# Patient Record
Sex: FEMALE | Race: BLACK OR AFRICAN AMERICAN | NOT HISPANIC OR LATINO | Employment: OTHER | URBAN - METROPOLITAN AREA
[De-identification: names, ages, dates, MRNs, and addresses within clinical notes are randomized per-mention and may not be internally consistent; named-entity substitution may affect disease eponyms.]

---

## 2019-04-17 ENCOUNTER — HOSPITAL ENCOUNTER (INPATIENT)
Facility: HOSPITAL | Age: 64
LOS: 3 days | Discharge: HOME | DRG: 189 | End: 2019-04-20
Attending: EMERGENCY MEDICINE | Admitting: HOSPITALIST
Payer: MEDICAID

## 2019-04-17 DIAGNOSIS — J96.22 ACUTE ON CHRONIC RESPIRATORY FAILURE WITH HYPOXIA AND HYPERCAPNIA: Primary | ICD-10-CM

## 2019-04-17 DIAGNOSIS — J96.21 ACUTE ON CHRONIC RESPIRATORY FAILURE WITH HYPOXIA AND HYPERCAPNIA: Primary | ICD-10-CM

## 2019-04-17 DIAGNOSIS — R06.02 SHORTNESS OF BREATH: ICD-10-CM

## 2019-04-17 DIAGNOSIS — J44.9 STAGE 4 VERY SEVERE COPD BY GOLD CLASSIFICATION: ICD-10-CM

## 2019-04-17 DIAGNOSIS — Z71.89 GOALS OF CARE, COUNSELING/DISCUSSION: ICD-10-CM

## 2019-04-17 PROBLEM — E11.9 TYPE 2 DIABETES MELLITUS, WITHOUT LONG-TERM CURRENT USE OF INSULIN: Status: ACTIVE | Noted: 2019-04-17

## 2019-04-17 PROBLEM — E78.5 HYPERLIPIDEMIA: Status: ACTIVE | Noted: 2019-04-17

## 2019-04-17 PROBLEM — I10 ESSENTIAL HYPERTENSION: Status: ACTIVE | Noted: 2019-04-17

## 2019-04-17 PROBLEM — D61.818 PANCYTOPENIA: Status: ACTIVE | Noted: 2019-01-26

## 2019-04-17 PROBLEM — R31.9 HEMATURIA: Status: ACTIVE | Noted: 2019-04-17

## 2019-04-17 PROBLEM — E44.0 MODERATE MALNUTRITION: Status: ACTIVE | Noted: 2019-02-21

## 2019-04-17 PROBLEM — D69.6 THROMBOCYTOPENIA: Status: ACTIVE | Noted: 2018-11-10

## 2019-04-17 PROBLEM — I50.30 (HFPEF) HEART FAILURE WITH PRESERVED EJECTION FRACTION: Status: ACTIVE | Noted: 2019-04-17

## 2019-04-17 PROBLEM — M32.9 SYSTEMIC LUPUS ERYTHEMATOSUS: Status: ACTIVE | Noted: 2019-01-26

## 2019-04-17 LAB
ALLENS TEST: ABNORMAL
ANION GAP SERPL CALC-SCNC: 13 MMOL/L (ref 8–16)
BASOPHILS # BLD AUTO: 0.01 K/UL (ref 0–0.2)
BASOPHILS NFR BLD: 0.2 % (ref 0–1.9)
BILIRUB UR QL STRIP: NEGATIVE
BNP SERPL-MCNC: 490 PG/ML (ref 0–99)
BUN SERPL-MCNC: 10 MG/DL (ref 8–23)
CALCIUM SERPL-MCNC: 9.3 MG/DL (ref 8.7–10.5)
CHLORIDE SERPL-SCNC: 84 MMOL/L (ref 95–110)
CLARITY UR REFRACT.AUTO: ABNORMAL
CO2 SERPL-SCNC: 47 MMOL/L (ref 23–29)
COLOR UR AUTO: YELLOW
CREAT SERPL-MCNC: 0.7 MG/DL (ref 0.5–1.4)
CRP SERPL-MCNC: 22.6 MG/L (ref 0–8.2)
DELSYS: ABNORMAL
DIFFERENTIAL METHOD: ABNORMAL
EOSINOPHIL # BLD AUTO: 0 K/UL (ref 0–0.5)
EOSINOPHIL NFR BLD: 0 % (ref 0–8)
EP: 5
EP: 5
ERYTHROCYTE [DISTWIDTH] IN BLOOD BY AUTOMATED COUNT: 13.3 % (ref 11.5–14.5)
ERYTHROCYTE [SEDIMENTATION RATE] IN BLOOD BY WESTERGREN METHOD: 14 MM/H
ERYTHROCYTE [SEDIMENTATION RATE] IN BLOOD BY WESTERGREN METHOD: 18 MM/H
ERYTHROCYTE [SEDIMENTATION RATE] IN BLOOD BY WESTERGREN METHOD: 29 MM/HR (ref 0–36)
EST. GFR  (AFRICAN AMERICAN): >60 ML/MIN/1.73 M^2
EST. GFR  (NON AFRICAN AMERICAN): >60 ML/MIN/1.73 M^2
FIO2: 40
FIO2: 50
GLUCOSE SERPL-MCNC: 211 MG/DL (ref 70–110)
GLUCOSE UR QL STRIP: ABNORMAL
HCO3 UR-SCNC: 48.7 MMOL/L (ref 24–28)
HCO3 UR-SCNC: 60.7 MMOL/L (ref 24–28)
HCO3 UR-SCNC: 64.8 MMOL/L (ref 24–28)
HCT VFR BLD AUTO: 39.6 % (ref 37–48.5)
HGB BLD-MCNC: 11 G/DL (ref 12–16)
HGB UR QL STRIP: ABNORMAL
IMM GRANULOCYTES # BLD AUTO: 0.01 K/UL (ref 0–0.04)
IMM GRANULOCYTES NFR BLD AUTO: 0.2 % (ref 0–0.5)
INFLUENZA A, MOLECULAR: NEGATIVE
INFLUENZA B, MOLECULAR: NEGATIVE
IP: 10
IP: 20
KETONES UR QL STRIP: ABNORMAL
LEUKOCYTE ESTERASE UR QL STRIP: NEGATIVE
LYMPHOCYTES # BLD AUTO: 0.7 K/UL (ref 1–4.8)
LYMPHOCYTES NFR BLD: 14.3 % (ref 18–48)
MCH RBC QN AUTO: 28.1 PG (ref 27–31)
MCHC RBC AUTO-ENTMCNC: 27.8 G/DL (ref 32–36)
MCV RBC AUTO: 101 FL (ref 82–98)
MICROSCOPIC COMMENT: ABNORMAL
MIN VOL: 5
MODE: ABNORMAL
MONOCYTES # BLD AUTO: 0.5 K/UL (ref 0.3–1)
MONOCYTES NFR BLD: 10.7 % (ref 4–15)
NEUTROPHILS # BLD AUTO: 3.5 K/UL (ref 1.8–7.7)
NEUTROPHILS NFR BLD: 74.6 % (ref 38–73)
NITRITE UR QL STRIP: NEGATIVE
NRBC BLD-RTO: 0 /100 WBC
PCO2 BLDA: 105 MMHG (ref 35–45)
PCO2 BLDA: 121.3 MMHG (ref 35–45)
PCO2 BLDA: 97.5 MMHG (ref 35–45)
PH SMN: 7.31 [PH] (ref 7.35–7.45)
PH SMN: 7.31 [PH] (ref 7.35–7.45)
PH SMN: 7.4 [PH] (ref 7.35–7.45)
PH UR STRIP: 8 [PH] (ref 5–8)
PLATELET # BLD AUTO: 82 K/UL (ref 150–350)
PMV BLD AUTO: 12.2 FL (ref 9.2–12.9)
PO2 BLDA: 51 MMHG (ref 40–60)
PO2 BLDA: 64 MMHG (ref 80–100)
PO2 BLDA: 71 MMHG (ref 80–100)
POC BE: 22 MMOL/L
POC BE: >30 MMOL/L
POC BE: >30 MMOL/L
POC SATURATED O2: 79 % (ref 95–100)
POC SATURATED O2: 86 % (ref 95–100)
POC SATURATED O2: 92 % (ref 95–100)
POC TCO2: >50 MMOL/L (ref 23–27)
POC TCO2: >50 MMOL/L (ref 23–27)
POC TCO2: >50 MMOL/L (ref 24–29)
POCT GLUCOSE: 286 MG/DL (ref 70–110)
POTASSIUM SERPL-SCNC: 4.3 MMOL/L (ref 3.5–5.1)
PROT UR QL STRIP: NEGATIVE
RBC # BLD AUTO: 3.92 M/UL (ref 4–5.4)
RBC #/AREA URNS AUTO: 15 /HPF (ref 0–4)
SAMPLE: ABNORMAL
SITE: ABNORMAL
SODIUM SERPL-SCNC: 144 MMOL/L (ref 136–145)
SP GR UR STRIP: 1.01 (ref 1–1.03)
SP02: 98
SPECIMEN SOURCE: NORMAL
SPONT RATE: 18
SPONT RATE: 37
SQUAMOUS #/AREA URNS AUTO: 4 /HPF
TROPONIN I SERPL DL<=0.01 NG/ML-MCNC: 0.01 NG/ML (ref 0–0.03)
URN SPEC COLLECT METH UR: ABNORMAL
WBC # BLD AUTO: 4.75 K/UL (ref 3.9–12.7)

## 2019-04-17 PROCEDURE — 11000001 HC ACUTE MED/SURG PRIVATE ROOM

## 2019-04-17 PROCEDURE — 85025 COMPLETE CBC W/AUTO DIFF WBC: CPT

## 2019-04-17 PROCEDURE — 93010 EKG 12-LEAD: ICD-10-PCS | Mod: ,,, | Performed by: INTERNAL MEDICINE

## 2019-04-17 PROCEDURE — 99291 CRITICAL CARE FIRST HOUR: CPT | Mod: 25

## 2019-04-17 PROCEDURE — 86225 DNA ANTIBODY NATIVE: CPT

## 2019-04-17 PROCEDURE — 86235 NUCLEAR ANTIGEN ANTIBODY: CPT

## 2019-04-17 PROCEDURE — 25000003 PHARM REV CODE 250: Performed by: STUDENT IN AN ORGANIZED HEALTH CARE EDUCATION/TRAINING PROGRAM

## 2019-04-17 PROCEDURE — 81001 URINALYSIS AUTO W/SCOPE: CPT

## 2019-04-17 PROCEDURE — 80048 BASIC METABOLIC PNL TOTAL CA: CPT

## 2019-04-17 PROCEDURE — 99900035 HC TECH TIME PER 15 MIN (STAT)

## 2019-04-17 PROCEDURE — 63600175 PHARM REV CODE 636 W HCPCS: Performed by: EMERGENCY MEDICINE

## 2019-04-17 PROCEDURE — 93005 ELECTROCARDIOGRAM TRACING: CPT

## 2019-04-17 PROCEDURE — 85652 RBC SED RATE AUTOMATED: CPT

## 2019-04-17 PROCEDURE — 86140 C-REACTIVE PROTEIN: CPT

## 2019-04-17 PROCEDURE — 96375 TX/PRO/DX INJ NEW DRUG ADDON: CPT

## 2019-04-17 PROCEDURE — 82803 BLOOD GASES ANY COMBINATION: CPT

## 2019-04-17 PROCEDURE — 96374 THER/PROPH/DIAG INJ IV PUSH: CPT

## 2019-04-17 PROCEDURE — 63600175 PHARM REV CODE 636 W HCPCS: Performed by: PHYSICIAN ASSISTANT

## 2019-04-17 PROCEDURE — 25000242 PHARM REV CODE 250 ALT 637 W/ HCPCS: Performed by: PHYSICIAN ASSISTANT

## 2019-04-17 PROCEDURE — 94640 AIRWAY INHALATION TREATMENT: CPT

## 2019-04-17 PROCEDURE — 93010 ELECTROCARDIOGRAM REPORT: CPT | Mod: ,,, | Performed by: INTERNAL MEDICINE

## 2019-04-17 PROCEDURE — 36415 COLL VENOUS BLD VENIPUNCTURE: CPT

## 2019-04-17 PROCEDURE — 27000221 HC OXYGEN, UP TO 24 HOURS

## 2019-04-17 PROCEDURE — 25000242 PHARM REV CODE 250 ALT 637 W/ HCPCS: Performed by: STUDENT IN AN ORGANIZED HEALTH CARE EDUCATION/TRAINING PROGRAM

## 2019-04-17 PROCEDURE — 94761 N-INVAS EAR/PLS OXIMETRY MLT: CPT

## 2019-04-17 PROCEDURE — 87502 INFLUENZA DNA AMP PROBE: CPT

## 2019-04-17 PROCEDURE — 86038 ANTINUCLEAR ANTIBODIES: CPT

## 2019-04-17 PROCEDURE — 84484 ASSAY OF TROPONIN QUANT: CPT

## 2019-04-17 PROCEDURE — 94660 CPAP INITIATION&MGMT: CPT

## 2019-04-17 PROCEDURE — 99291 CRITICAL CARE FIRST HOUR: CPT | Mod: ,,, | Performed by: EMERGENCY MEDICINE

## 2019-04-17 PROCEDURE — 36600 WITHDRAWAL OF ARTERIAL BLOOD: CPT

## 2019-04-17 PROCEDURE — 99291 PR CRITICAL CARE, E/M 30-74 MINUTES: ICD-10-PCS | Mod: ,,, | Performed by: EMERGENCY MEDICINE

## 2019-04-17 PROCEDURE — 27000190 HC CPAP FULL FACE MASK W/VALVE

## 2019-04-17 PROCEDURE — 63600175 PHARM REV CODE 636 W HCPCS: Performed by: STUDENT IN AN ORGANIZED HEALTH CARE EDUCATION/TRAINING PROGRAM

## 2019-04-17 PROCEDURE — 83880 ASSAY OF NATRIURETIC PEPTIDE: CPT

## 2019-04-17 PROCEDURE — 25000242 PHARM REV CODE 250 ALT 637 W/ HCPCS: Performed by: EMERGENCY MEDICINE

## 2019-04-17 RX ORDER — PREDNISONE 20 MG/1
40 TABLET ORAL DAILY
Status: DISCONTINUED | OUTPATIENT
Start: 2019-04-18 | End: 2019-04-20 | Stop reason: HOSPADM

## 2019-04-17 RX ORDER — DILTIAZEM HYDROCHLORIDE 180 MG/1
180 CAPSULE, COATED, EXTENDED RELEASE ORAL DAILY
Status: DISCONTINUED | OUTPATIENT
Start: 2019-04-17 | End: 2019-04-20 | Stop reason: HOSPADM

## 2019-04-17 RX ORDER — MONTELUKAST SODIUM 10 MG/1
10 TABLET ORAL DAILY
Status: DISCONTINUED | OUTPATIENT
Start: 2019-04-18 | End: 2019-04-20 | Stop reason: HOSPADM

## 2019-04-17 RX ORDER — IPRATROPIUM BROMIDE AND ALBUTEROL SULFATE 2.5; .5 MG/3ML; MG/3ML
3 SOLUTION RESPIRATORY (INHALATION) EVERY 4 HOURS
Status: COMPLETED | OUTPATIENT
Start: 2019-04-17 | End: 2019-04-18

## 2019-04-17 RX ORDER — INSULIN ASPART 100 [IU]/ML
0-5 INJECTION, SOLUTION INTRAVENOUS; SUBCUTANEOUS
Status: DISCONTINUED | OUTPATIENT
Start: 2019-04-17 | End: 2019-04-20 | Stop reason: HOSPADM

## 2019-04-17 RX ORDER — DILTIAZEM HYDROCHLORIDE 180 MG/1
180 CAPSULE, COATED, EXTENDED RELEASE ORAL DAILY
Status: DISCONTINUED | OUTPATIENT
Start: 2019-04-18 | End: 2019-04-17

## 2019-04-17 RX ORDER — FUROSEMIDE 10 MG/ML
40 INJECTION INTRAMUSCULAR; INTRAVENOUS
Status: COMPLETED | OUTPATIENT
Start: 2019-04-17 | End: 2019-04-17

## 2019-04-17 RX ORDER — AMLODIPINE BESYLATE 10 MG/1
10 TABLET ORAL DAILY
Status: DISCONTINUED | OUTPATIENT
Start: 2019-04-18 | End: 2019-04-20 | Stop reason: HOSPADM

## 2019-04-17 RX ORDER — HYDROCHLOROTHIAZIDE 25 MG/1
25 TABLET ORAL DAILY
Status: DISCONTINUED | OUTPATIENT
Start: 2019-04-18 | End: 2019-04-18

## 2019-04-17 RX ORDER — DOXYCYCLINE HYCLATE 100 MG
100 TABLET ORAL EVERY 12 HOURS
Status: DISCONTINUED | OUTPATIENT
Start: 2019-04-17 | End: 2019-04-20 | Stop reason: HOSPADM

## 2019-04-17 RX ORDER — GLUCAGON 1 MG
1 KIT INJECTION
Status: DISCONTINUED | OUTPATIENT
Start: 2019-04-17 | End: 2019-04-20 | Stop reason: HOSPADM

## 2019-04-17 RX ORDER — METHYLPREDNISOLONE SOD SUCC 125 MG
125 VIAL (EA) INJECTION
Status: COMPLETED | OUTPATIENT
Start: 2019-04-17 | End: 2019-04-17

## 2019-04-17 RX ORDER — ASPIRIN 81 MG/1
81 TABLET ORAL DAILY
Status: DISCONTINUED | OUTPATIENT
Start: 2019-04-18 | End: 2019-04-20 | Stop reason: HOSPADM

## 2019-04-17 RX ORDER — NITROGLYCERIN 0.4 MG/1
0.4 TABLET SUBLINGUAL
Status: DISCONTINUED | OUTPATIENT
Start: 2019-04-17 | End: 2019-04-17

## 2019-04-17 RX ORDER — TIOTROPIUM BROMIDE 18 UG/1
1 CAPSULE ORAL; RESPIRATORY (INHALATION) DAILY
Status: DISCONTINUED | OUTPATIENT
Start: 2019-04-18 | End: 2019-04-20 | Stop reason: HOSPADM

## 2019-04-17 RX ORDER — IPRATROPIUM BROMIDE AND ALBUTEROL SULFATE 2.5; .5 MG/3ML; MG/3ML
3 SOLUTION RESPIRATORY (INHALATION) EVERY 4 HOURS PRN
Status: DISCONTINUED | OUTPATIENT
Start: 2019-04-17 | End: 2019-04-20 | Stop reason: HOSPADM

## 2019-04-17 RX ORDER — IBUPROFEN 200 MG
24 TABLET ORAL
Status: DISCONTINUED | OUTPATIENT
Start: 2019-04-17 | End: 2019-04-20 | Stop reason: HOSPADM

## 2019-04-17 RX ORDER — ATORVASTATIN CALCIUM 20 MG/1
20 TABLET, FILM COATED ORAL DAILY
Status: DISCONTINUED | OUTPATIENT
Start: 2019-04-18 | End: 2019-04-20 | Stop reason: HOSPADM

## 2019-04-17 RX ORDER — ENOXAPARIN SODIUM 100 MG/ML
30 INJECTION SUBCUTANEOUS EVERY 24 HOURS
Status: DISCONTINUED | OUTPATIENT
Start: 2019-04-18 | End: 2019-04-20 | Stop reason: HOSPADM

## 2019-04-17 RX ORDER — FLUTICASONE FUROATE AND VILANTEROL 200; 25 UG/1; UG/1
1 POWDER RESPIRATORY (INHALATION) DAILY
Status: DISCONTINUED | OUTPATIENT
Start: 2019-04-18 | End: 2019-04-20 | Stop reason: HOSPADM

## 2019-04-17 RX ORDER — IPRATROPIUM BROMIDE AND ALBUTEROL SULFATE 2.5; .5 MG/3ML; MG/3ML
3 SOLUTION RESPIRATORY (INHALATION)
Status: COMPLETED | OUTPATIENT
Start: 2019-04-17 | End: 2019-04-17

## 2019-04-17 RX ORDER — FUROSEMIDE 10 MG/ML
40 INJECTION INTRAMUSCULAR; INTRAVENOUS DAILY
Status: DISCONTINUED | OUTPATIENT
Start: 2019-04-18 | End: 2019-04-18

## 2019-04-17 RX ORDER — IPRATROPIUM BROMIDE AND ALBUTEROL SULFATE 2.5; .5 MG/3ML; MG/3ML
3 SOLUTION RESPIRATORY (INHALATION) EVERY 4 HOURS
Status: DISCONTINUED | OUTPATIENT
Start: 2019-04-17 | End: 2019-04-17

## 2019-04-17 RX ORDER — IBUPROFEN 200 MG
16 TABLET ORAL
Status: DISCONTINUED | OUTPATIENT
Start: 2019-04-17 | End: 2019-04-20 | Stop reason: HOSPADM

## 2019-04-17 RX ORDER — ALBUTEROL SULFATE 2.5 MG/.5ML
20 SOLUTION RESPIRATORY (INHALATION)
Status: COMPLETED | OUTPATIENT
Start: 2019-04-17 | End: 2019-04-17

## 2019-04-17 RX ADMIN — METHYLPREDNISOLONE SODIUM SUCCINATE 125 MG: 125 INJECTION, POWDER, FOR SOLUTION INTRAMUSCULAR; INTRAVENOUS at 03:04

## 2019-04-17 RX ADMIN — FUROSEMIDE 40 MG: 10 INJECTION, SOLUTION INTRAMUSCULAR; INTRAVENOUS at 06:04

## 2019-04-17 RX ADMIN — ALBUTEROL SULFATE 20 MG: 2.5 SOLUTION RESPIRATORY (INHALATION) at 03:04

## 2019-04-17 RX ADMIN — IPRATROPIUM BROMIDE AND ALBUTEROL SULFATE 3 ML: .5; 3 SOLUTION RESPIRATORY (INHALATION) at 04:04

## 2019-04-17 RX ADMIN — DILTIAZEM HYDROCHLORIDE 180 MG: 180 CAPSULE, COATED, EXTENDED RELEASE ORAL at 10:04

## 2019-04-17 RX ADMIN — DOXYCYCLINE HYCLATE 100 MG: 100 TABLET, COATED ORAL at 06:04

## 2019-04-17 RX ADMIN — INSULIN ASPART 1 UNITS: 100 INJECTION, SOLUTION INTRAVENOUS; SUBCUTANEOUS at 10:04

## 2019-04-17 RX ADMIN — IPRATROPIUM BROMIDE AND ALBUTEROL SULFATE 3 ML: .5; 3 SOLUTION RESPIRATORY (INHALATION) at 08:04

## 2019-04-17 NOTE — CONSULTS
[facility]  Critical Care Medicine  Consult Note    Patient Name: Monica Strickland  MRN: [id number]  Admission Date: 4/17/2019  Hospital Length of Stay: 0 days  Code Status: No Order  Attending Physician: Junior Luevano MD   Primary Care Provider: No primary care provider on file.   Principal Problem: Acute on chronic respiratory failure with hypoxia and hypercapnia    Consults  Subjective:     HPI:  63 yro F with PMH of COPD (3 L home O2, home bipap qhs), HTN, HTN, SLE, NIDDM2 (A1C 6.9), and HFpEF (echo 12/2018 dd) who presents to the ED 4/17/19 with SOB present for about the past week. Per ED note, pt received DuoNebs x 3 en route to the ED and was placed on 6 L and O2 sats remained around 92%. Patient denies cough, sputum production, sick contacts, fevers, chills, CP. She states she has received her flu and PNA shots. She was recently discharged from the [facility] ICU 4/12/19 for acute on chronic hypercapnic respiratory failure due to COPD exacerbation with PCO2 on presentation of 143 and required intubation. At that time, she was noted to be non-compliant with her COPD medications. She was discharged with Spiriva, PRN duonebs, and breo.  reports she has been non-complaint with her qhs bipap, taking it off in the middle of the night about every other night.    In the ED, patient received duonebs and IV steroids. Her ABG at 16:28 showed pH of 7.30, CO2 121, O2 64, HCO3 60. Patient was then placed on bipap at 18/6 and 50%. Repeat blood gas about 1.5 hrs later showed pH 7.39, and CO2 in the 105. Significant labs in ED: serum [id number], . CXR showed congestion consistent with heart failure.     Hospital/ICU Course:  No notes on file    Past Medical History:   Diagnosis Date    COPD (chronic obstructive pulmonary disease)        History reviewed. No pertinent surgical history.    Review of patient's allergies indicates:  No Known Allergies    Family History     None        Tobacco Use    Smoking  status: Not on file   Substance and Sexual Activity    Alcohol use: Not on file    Drug use: Not on file    Sexual activity: Not on file      Review of Systems   Unable to perform ROS: Other (bipap mask)     Objective:     Vital Signs (Most Recent):  Temp: 98.5 °F (36.9 °C) (04/17/19 1436)  Pulse: 98 (04/17/19 1740)  Resp: 15 (04/17/19 1648)  BP: (!) 175/86 (04/17/19 1601)  SpO2: 98 % (04/17/19 1740) Vital Signs (24h Range):  Temp:  [98.5 °F (36.9 °C)] 98.5 °F (36.9 °C)  Pulse:  [] 98  Resp:  [14-37] 15  SpO2:  [82 %-100 %] 98 %  BP: (160-175)/(80-86) 175/86      There is no height or weight on file to calculate BMI.    No intake or output data in the 24 hours ending 04/17/19 1804    Physical Exam   Constitutional: She is oriented to person, place, and time. She appears well-developed and well-nourished.   Speaking in full sentences   HENT:   Head: Normocephalic and atraumatic.   Eyes: Pupils are equal, round, and reactive to light. EOM are normal.   Neck: Neck supple.   Cardiovascular: Normal rate, regular rhythm, normal heart sounds and intact distal pulses.   No murmur heard.  Pulmonary/Chest: No respiratory distress. She has wheezes.   Bilateral crackles   Abdominal: Soft. Bowel sounds are normal. She exhibits no distension. There is no tenderness.   Musculoskeletal: She exhibits no edema, tenderness or deformity.   Lymphadenopathy:     She has no cervical adenopathy.   Neurological: She is alert and oriented to person, place, and time.   Skin: Skin is warm and dry.       Vents:  Oxygen Concentration (%): 50 (04/17/19 1648)  Lines/Drains/Airways     Peripheral Intravenous Line                 Peripheral IV - Single Lumen 04/17/19 20 G Left Hand less than 1 day              Significant Labs:    CBC/Anemia Profile:  Recent Labs   Lab 04/17/19  1457   WBC 4.75   HGB 11.0*   HCT 39.6   PLT 82*   *   RDW 13.3        Chemistries:  Recent Labs   Lab 04/17/19  1457      K 4.3   CL 84*   CO2 47*    BUN 10   CREATININE 0.7   CALCIUM 9.3       Significant Imaging: I have reviewed all pertinent imaging results/findings within the past 24 hours.      ABG  Recent Labs   Lab 04/17/19  1740   PH 7.398   PO2 71*   PCO2 105.0*   HCO3 64.8*   BE >30*     Assessment/Plan:     Pulmonary  * Acute on chronic respiratory failure with hypoxia and hypercapnia  63 yro F with PMH of COPD (3 L home O2, home bipap qhs),  SLE, and HFpEF (echo 12/2018 dd) who presents to the ED 4/17/19 with SOB likely 2/2 COPD exacerbation from home bipap non-compliance, as well as component of CHF exacerbation.    -on admission, satting in low 80s, ABG showed pH 7.30 and CO2 121, BNP 400s (preivously 100s)  -placed on bipap 18/6 and 50%; now satting 100% and ABG 1.5 hrs later improved to pH 7.39 and CO2 105  -patient reports feeling improved since arrival and she is compensating for her hypercapnia. she is also chronically hypercarbic with serum HCO3 47  -goal sats 88-92%, providing more oxygen than necessary will worsen COPD exacerbation  -recommend steroids, doxycycline, resume home meds (spiriva, breo, singulair), lasix  -recommend titrating bipap to pH since patient is chronically hypercapnic   -encourage home qhs bipap compliance   -patient is stable for care on the floor, please call [phone] for any additional questions or issues. Thank you for this consult. Will sign off.    Cardiac/Vascular  Essential hypertension  -resume home anti-HTNsive medications  -home meds: HCTZ 25, Norvasc 10, diltiazem 180    (HFpEF) heart failure with preserved ejection fraction  -12/2018 echo showed EF 50-55%, dd  -consider repeat echo in setting of hypoxia on admission and elevated BNP >400 (previously 100s)  -diurese    Endocrine  Type 2 diabetes mellitus, without long-term current use of insulin  -Last A1c 6.9  -home meds: metformin 500 BID  -LDSSI    SLE       -pt has hx of SLE, EDD and anti Sm RNP are positive per care everywhere       -was discharged from  [facility] ICU 4/12 with out pt rheumatology FU    Critical care was time spent personally by me on the following activities: development of treatment plan with patient or surrogate and bedside caregivers, discussions with consultants, evaluation of patient's response to treatment, examination of patient, ordering and performing treatments and interventions, ordering and review of laboratory studies, ordering and review of radiographic studies, pulse oximetry, re-evaluation of patient's condition. This critical care time did not overlap with that of any other provider or involve time for any procedures.    Thank you for your consult. I will sign off. Please contact us if you have any additional questions.     Denita Lopez MD  Critical Care Medicine  [facility]

## 2019-04-17 NOTE — CONSULTS
Patient evaluated by critical care medicine. Patient is stable for care on the hospital medicine floor. Please see full consult note to follow dated 4/17/19.    Bibiana Lopez MD  IM PGY-3

## 2019-04-17 NOTE — ASSESSMENT & PLAN NOTE
63 yro F with PMH of COPD (3 L home O2, home bipap qhs),  SLE, and HFpEF (echo 12/2018 dd) who presents to the ED 4/17/19 with SOB likely 2/2 COPD exacerbation from home bipap non-compliance, as well as component of CHF exacerbation.    -on admission, satting in low 80s, ABG showed pH 7.30 and CO2 121  -placed on bipap 18/6 and 50%; now satting 100% and ABG 1.5 hrs later improved to pH 7.39 and CO2 105  -patient reports feeling improved since arrival and she is compensating for her hypercapnia. she is also chronically hypercarbic with serum HCO3 47  -goal sats 88-92%, providing more oxygen than necessary will worsen COPD exacerbation  -recommend steroids, doxycycline, resume home meds (spiriva, breo, singulair), lasix  -recommend titrating bipap to pH since patient is chronically hypercapnic   -encourage home qhs bipap compliance   -patient is stable for care on the floor, please call [phone] for any additional questions or issues. Thank you for this consult. Will sign off.

## 2019-04-17 NOTE — SUBJECTIVE & OBJECTIVE
Past Medical History:   Diagnosis Date    COPD (chronic obstructive pulmonary disease)        History reviewed. No pertinent surgical history.    Review of patient's allergies indicates:  No Known Allergies    Family History     None        Tobacco Use    Smoking status: Not on file   Substance and Sexual Activity    Alcohol use: Not on file    Drug use: Not on file    Sexual activity: Not on file      Review of Systems   Unable to perform ROS: Other (bipap mask)     Objective:     Vital Signs (Most Recent):  Temp: 98.5 °F (36.9 °C) (04/17/19 1436)  Pulse: 98 (04/17/19 1740)  Resp: 15 (04/17/19 1648)  BP: (!) 175/86 (04/17/19 1601)  SpO2: 98 % (04/17/19 1740) Vital Signs (24h Range):  Temp:  [98.5 °F (36.9 °C)] 98.5 °F (36.9 °C)  Pulse:  [] 98  Resp:  [14-37] 15  SpO2:  [82 %-100 %] 98 %  BP: (160-175)/(80-86) 175/86      There is no height or weight on file to calculate BMI.    No intake or output data in the 24 hours ending 04/17/19 1804    Physical Exam   Constitutional: She is oriented to person, place, and time. She appears well-developed and well-nourished.   Speaking in full sentences   HENT:   Head: Normocephalic and atraumatic.   Eyes: Pupils are equal, round, and reactive to light. EOM are normal.   Neck: Neck supple.   Cardiovascular: Normal rate, regular rhythm, normal heart sounds and intact distal pulses.   No murmur heard.  Pulmonary/Chest: No respiratory distress. She has wheezes.   Bilateral crackles   Abdominal: Soft. Bowel sounds are normal. She exhibits no distension. There is no tenderness.   Musculoskeletal: She exhibits no edema, tenderness or deformity.   Lymphadenopathy:     She has no cervical adenopathy.   Neurological: She is alert and oriented to person, place, and time.   Skin: Skin is warm and dry.       Vents:  Oxygen Concentration (%): 50 (04/17/19 1648)  Lines/Drains/Airways     Peripheral Intravenous Line                 Peripheral IV - Single Lumen 04/17/19 20 G Left  Hand less than 1 day              Significant Labs:    CBC/Anemia Profile:  Recent Labs   Lab 04/17/19  1457   WBC 4.75   HGB 11.0*   HCT 39.6   PLT 82*   *   RDW 13.3        Chemistries:  Recent Labs   Lab 04/17/19  1457      K 4.3   CL 84*   CO2 47*   BUN 10   CREATININE 0.7   CALCIUM 9.3       Significant Imaging: I have reviewed all pertinent imaging results/findings within the past 24 hours.

## 2019-04-17 NOTE — ED TRIAGE NOTES
Monica Stricklnad, a 63 y.o. female presents to the ED w/ complaint of severe shortness of breath, pt has hx of COPD. Usually gets care at [facility].  Pt only able to talk in one word sentences.     Triage note:  Chief Complaint   Patient presents with    Shortness of Breath     x 1 week      Review of patient's allergies indicates:  No Known Allergies  Past Medical History:   Diagnosis Date    COPD (chronic obstructive pulmonary disease)

## 2019-04-17 NOTE — ED PROVIDER NOTES
Encounter Date: 4/17/2019       History     Chief Complaint   Patient presents with    Shortness of Breath     x 1 week      63-year-old female with a history of COPD, chronic respiratory failure on continuous O2 at 3 L via nasal cannula, NIDDM, MI, HTN, HLD, SLE, presents via EMS for evaluation of shortness of breath. EMS reports that the daughter noted that the patient has been short of breath for the past week.  Pt received DuoNebs x 3 en route to the ED and was placed on 6 L. O2 sats remained around 92%.  Patient denies fever or cough.  She denies chest pain.         Review of patient's allergies indicates:  No Known Allergies  Past Medical History:   Diagnosis Date    COPD (chronic obstructive pulmonary disease)      History reviewed. No pertinent surgical history.  History reviewed. No pertinent family history.  Social History     Tobacco Use    Smoking status: Not on file   Substance Use Topics    Alcohol use: Not on file    Drug use: Not on file     Review of Systems   Constitutional: Negative for fever.   HENT: Negative for sore throat.    Respiratory: Positive for shortness of breath.    Cardiovascular: Negative for chest pain.   Gastrointestinal: Negative for nausea.   Genitourinary: Negative for dysuria.   Musculoskeletal: Negative for back pain.   Skin: Negative for rash.   Neurological: Negative for weakness.   Hematological: Does not bruise/bleed easily.       Physical Exam     Initial Vitals [04/17/19 1436]   BP Pulse Resp Temp SpO2   (!) 160/80 100 (!) 24 98.5 °F (36.9 °C) (!) 82 %      MAP       --         Physical Exam    Nursing note and vitals reviewed.  Constitutional: She appears well-developed and well-nourished. She is not diaphoretic.  Non-toxic appearance. She does not appear ill. She appears distressed.   HENT:   Head: Normocephalic and atraumatic.   Neck: Neck supple.   Cardiovascular: Normal rate and regular rhythm. Exam reveals no gallop and no friction rub.    No murmur  heard.  Pulmonary/Chest: Accessory muscle usage present. Tachypnea noted. She is in respiratory distress. She has decreased breath sounds. She has wheezes. She has no rhonchi. She has no rales.   Patient able to speak 1-word sentences only   Abdominal: She exhibits no distension.   Neurological: She is alert.   Skin: No rash noted.   Psychiatric: She has a normal mood and affect. Her behavior is normal.         ED Course   Critical Care  Date/Time: 4/17/2019 4:29 PM  Performed by: Nazario Johnson DO  Authorized by: Nazario Johnson DO   Direct patient critical care time: 10 minutes  Additional history critical care time: 7 minutes  Ordering / reviewing critical care time: 5 minutes  Documentation critical care time: 6 minutes  Consulting other physicians critical care time: 5 minutes  Consult with family critical care time: 5 minutes  Total critical care time (exclusive of procedural time) : 38 minutes  Critical care was necessary to treat or prevent imminent or life-threatening deterioration of the following conditions: respiratory failure.  Critical care was time spent personally by me on the following activities: development of treatment plan with patient or surrogate, discussions with consultants, discussions with primary provider, evaluation of patient's response to treatment, examination of patient, obtaining history from patient or surrogate, ordering and performing treatments and interventions, ordering and review of laboratory studies, ordering and review of radiographic studies, pulse oximetry, re-evaluation of patient's condition and review of old charts.        Labs Reviewed   CBC W/ AUTO DIFFERENTIAL - Abnormal; Notable for the following components:       Result Value    RBC 3.92 (*)     Hemoglobin 11.0 (*)      (*)     MCHC 27.8 (*)     Platelets 82 (*)     Lymph # 0.7 (*)     Gran% 74.6 (*)     Lymph% 14.3 (*)     All other components within normal limits   BASIC METABOLIC PANEL -  Abnormal; Notable for the following components:    Chloride 84 (*)     CO2 47 (*)     Glucose 211 (*)     All other components within normal limits   B-TYPE NATRIURETIC PEPTIDE - Abnormal; Notable for the following components:     (*)     All other components within normal limits   URINALYSIS, REFLEX TO URINE CULTURE - Abnormal; Notable for the following components:    Appearance, UA Cloudy (*)     Glucose, UA 2+ (*)     Ketones, UA Trace (*)     Occult Blood UA 1+ (*)     All other components within normal limits    Narrative:     Preferred Collection Type->Urine, Clean Catch   URINALYSIS MICROSCOPIC - Abnormal; Notable for the following components:    RBC, UA 15 (*)     All other components within normal limits    Narrative:     Preferred Collection Type->Urine, Clean Catch   ISTAT PROCEDURE - Abnormal; Notable for the following components:    POC PH 7.308 (*)     POC PCO2 121.3 (*)     POC PO2 64 (*)     POC HCO3 60.7 (*)     POC BE >30 (*)     POC SATURATED O2 86 (*)     POC TCO2 >50 (*)     All other components within normal limits   ISTAT PROCEDURE - Abnormal; Notable for the following components:    POC PH 7.307 (*)     POC PCO2 97.5 (*)     POC HCO3 48.7 (*)     POC SATURATED O2 79 (*)     POC TCO2 >50 (*)     All other components within normal limits   ISTAT PROCEDURE - Abnormal; Notable for the following components:    POC PCO2 105.0 (*)     POC PO2 71 (*)     POC HCO3 64.8 (*)     POC BE >30 (*)     POC SATURATED O2 92 (*)     POC TCO2 >50 (*)     All other components within normal limits   INFLUENZA A & B BY MOLECULAR   TROPONIN I   ANTI-DNA ANTIBODY, DOUBLE-STRANDED   SEDIMENTATION RATE   C-REACTIVE PROTEIN   EDD PROFILE I (SCREEN)   ANTI SM/RNP ANTIBODY   HEMOGLOBIN A1C   PROTEIN / CREATININE RATIO, URINE   POCT GLUCOSE MONITORING CONTINUOUS        ECG Results          EKG 12-lead (Final result)  Result time 04/17/19 15:49:14    Final result by Interface, Lab In [facility] (04/17/19 15:49:14)                  Narrative:    Test Reason : R06.02,    Vent. Rate : 095 BPM     Atrial Rate : 095 BPM     P-R Int : 116 ms          QRS Dur : 068 ms      QT Int : 370 ms       P-R-T Axes : 094 091 072 degrees     QTc Int : 464 ms    Normal sinus rhythm  Incomplete right bundle branch block  Rightward axis  Biatrial enlargement  Nonspecific ST and/or T wave abnormalities Inferolateral leads  Repolarization abnormality  Abnormal ECG  No previous ECGs available  Confirmed by fellow Angelia BURNHAM (Fellow's Unofficial Report), Dre ([id number])  on 4/17/2019 3:45:43 PM  Confirmed by ANUP SPRINGER MD ([id number]) on 4/17/2019 3:48:59 PM    Referred By: System System           Confirmed By:ANUP SPRINGER MD                            Imaging Results          X-Ray Chest AP Portable (Final result)  Result time 04/17/19 15:11:26    Final result by Liang Graves Jr., MD (04/17/19 15:11:26)                 Impression:      Findings most consistent with congestive heart failure.  Probable small left effusion.      Electronically signed by: Liang Graves MD  Date:    04/17/2019  Time:    15:11             Narrative:    EXAMINATION:  XR CHEST AP PORTABLE    CLINICAL HISTORY:  Shortness of breath    TECHNIQUE:  Single frontal view of the chest was performed.    COMPARISON:  None    FINDINGS:  Heart is enlarged.  Diffuse increase in the interstitial markings.  May be a small volume of pleural fluid at the left base.  No pneumothorax.                                 Medical Decision Making:   History:   Old Medical Records: I decided to obtain old medical records.  Old Records Summarized: records from another hospital.       <> Summary of Records: Patient discharged from [facility] on 04/12.  Patient admitted for acute on chronic respiratory failure.  She was intubated during this admission.   Differential Diagnosis:   My differential diagnosis includes but is not limited to:  COPD exacerbation, acute respiratory failure, pneumonia, CHF  Independently  Interpreted Test(s):   I have ordered and independently interpreted X-rays - see prior notes.  I have ordered and independently interpreted EKG Reading(s) - see prior notes  Clinical Tests:   Lab Tests: Ordered and Reviewed  Radiological Study: Ordered and Reviewed  Medical Tests: Ordered and Reviewed  Other:   I have discussed this case with another health care provider.       <> Summary of the Discussion: Critical care, Hospital Medicine       APC / Resident Notes:   63-year-old female with chronic respiratory failure, COPD presents with shortness of breath.  Patient presented in respiratory distress. Lung sounds diminished with wheezing noted. Patient speaking 1 word sentences.  She was promptly placed on BiPAP.    Initial Venous blood gas reveals a pH of 7.3 with pCO2 of 97, PO2 of 51.  Chest x-ray shows pulmonary vascular congestion.  BNP is elevated at 490.  Troponin within normal limits. Chemistry panel reveals a bicarb of 47.  Hyperglycemia at 211.    Patient's  arrived and reports that he feels the patient is more confused than normal. Patient was oriented x3 on my initial evaluation.  ABG was obtained which revealed pCO2 of 120, pH of 7.3.  Patient's BiPAP settings were increased.  Critical care medicine was consulted.  On their evaluation, patient was more alert and conversant.  They recommend a repeat ABG.  If improved, patient is stable for admission to Hospital Medicine floor.  They also recommend diuresis, scheduled duo nebs q.4 hours, daily prednisone 40 mg.   Will admit to hospital medicine for further treatment and management of acute on chronic respiratory failure.   I have reviewed the patient's records and discussed this case with my supervising physician.                   Attending Attestation:     Physician Attestation Statement for NP/PA:   I have conducted a face to face encounter with this patient in addition to the NP/PA, due to Medical Complexity    Other NP/PA Attestation  Additions:     Physical Exam: Diminished breath sounds in all lung fields.  Accessory muscle use.   Medical Decision Makin-year-old female severe respiratory distress.  Diminished breath sounds all lung fields.  Placed immediately on BiPAP.  PCO2 is 120.  Says pH is 7.3.                    Clinical Impression:       ICD-10-CM ICD-9-CM   1. Acute on chronic respiratory failure with hypoxia and hypercapnia J96.21 518.84    J96.22 786.09     799.02   2. Shortness of breath R06.02 786.05         Disposition:   Disposition: Admitted  Condition: Serious                        Krystyna Perez PA-C  19 [id number]       Nazario Johnson DO  19 0724

## 2019-04-17 NOTE — PROGRESS NOTES
Mrs. Anderson ABG results:   PH-   7.398  PCO2-   105  PO2-   71  HCO3-   64.8  SO2-   92    Mrs. Anderson is on BIPAP of ipap20 epap5 rate 18 rise 3 and o2 30% sat are 91.

## 2019-04-17 NOTE — ASSESSMENT & PLAN NOTE
-12/2018 echo showed EF 50-55%, dd  -consider repeat echo in setting of hypoxia on admission and elevated BNP >400 (previously 100s)  -sagn

## 2019-04-17 NOTE — HPI
63 yro F with PMH of COPD (3 L home O2, home bipap qhs), HTN, HTN, SLE, NIDDM2 (A1C 6.9), and HFpEF (echo 12/2018 dd) who presents to the ED 4/17/19 with SOB present for about the past week. Per ED note, pt received DuoNebs x 3 en route to the ED and was placed on 6 L and O2 sats remained around 92%. Patient denies cough, sputum production, sick contacts, wheezing, fevers, chills, CP. She states she has received her flu and PNA shots. She was recently discharged from the [facility] ICU 4/12/19 for acute on chronic hypercapnic respiratory failure due to COPD exacerbation with PCO2 on presentation of 143 and required intubation. At that time, she was noted to be non-compliant with her COPD medications. She was discharged with Spiriva, PRN duonebs, and breo.  reports she has been non-complaint with her qhs bipap, taking it off in the middle of the night about every other night.    In the ED, patient received duonebs and IV steroids. Her ABG at 16:28 showed pH of 7.30, CO2 121, O2 64, HCO3 60. Patient was then placed on bipap at 18/6 and 50%. Repeat blood gas about 1.5 hrs later showed pH 7.39, and CO2 in the 105s (ABG). Significant labs in ED: serum [id number], . CXR showed congestion consistent with heart failure.

## 2019-04-18 LAB
ALBUMIN SERPL BCP-MCNC: 3.3 G/DL (ref 3.5–5.2)
ALLENS TEST: ABNORMAL
ALP SERPL-CCNC: 70 U/L (ref 55–135)
ALT SERPL W/O P-5'-P-CCNC: 15 U/L (ref 10–44)
ANA SER QL IF: NORMAL
ANION GAP SERPL CALC-SCNC: 13 MMOL/L (ref 8–16)
AST SERPL-CCNC: 14 U/L (ref 10–40)
BASOPHILS # BLD AUTO: 0 K/UL (ref 0–0.2)
BASOPHILS NFR BLD: 0 % (ref 0–1.9)
BILIRUB SERPL-MCNC: 0.8 MG/DL (ref 0.1–1)
BUN SERPL-MCNC: 16 MG/DL (ref 8–23)
CALCIUM SERPL-MCNC: 9.4 MG/DL (ref 8.7–10.5)
CHLORIDE SERPL-SCNC: 76 MMOL/L (ref 95–110)
CO2 SERPL-SCNC: 51 MMOL/L (ref 23–29)
CREAT SERPL-MCNC: 0.8 MG/DL (ref 0.5–1.4)
DELSYS: ABNORMAL
DELSYS: ABNORMAL
DIFFERENTIAL METHOD: ABNORMAL
DSDNA AB SER-ACNC: NORMAL [IU]/ML
EOSINOPHIL # BLD AUTO: 0 K/UL (ref 0–0.5)
EOSINOPHIL NFR BLD: 0 % (ref 0–8)
EP: 5
EP: 5
ERYTHROCYTE [DISTWIDTH] IN BLOOD BY AUTOMATED COUNT: 13.2 % (ref 11.5–14.5)
ERYTHROCYTE [SEDIMENTATION RATE] IN BLOOD BY WESTERGREN METHOD: 18 MM/H
EST. GFR  (AFRICAN AMERICAN): >60 ML/MIN/1.73 M^2
EST. GFR  (NON AFRICAN AMERICAN): >60 ML/MIN/1.73 M^2
FIO2: 24
FIO2: 35
GLUCOSE SERPL-MCNC: 310 MG/DL (ref 70–110)
HCO3 UR-SCNC: 60 MMOL/L (ref 24–28)
HCO3 UR-SCNC: 63.1 MMOL/L (ref 24–28)
HCO3 UR-SCNC: 63.8 MMOL/L (ref 24–28)
HCT VFR BLD AUTO: 37 % (ref 37–48.5)
HGB BLD-MCNC: 11.1 G/DL (ref 12–16)
IMM GRANULOCYTES # BLD AUTO: 0.02 K/UL (ref 0–0.04)
IMM GRANULOCYTES NFR BLD AUTO: 0.6 % (ref 0–0.5)
IP: 15
IP: 20
LYMPHOCYTES # BLD AUTO: 0.3 K/UL (ref 1–4.8)
LYMPHOCYTES NFR BLD: 9.5 % (ref 18–48)
MAGNESIUM SERPL-MCNC: 1.5 MG/DL (ref 1.6–2.6)
MCH RBC QN AUTO: 28.2 PG (ref 27–31)
MCHC RBC AUTO-ENTMCNC: 30 G/DL (ref 32–36)
MCV RBC AUTO: 94 FL (ref 82–98)
MIN VOL: 11
MODE: ABNORMAL
MODE: ABNORMAL
MONOCYTES # BLD AUTO: 0.3 K/UL (ref 0.3–1)
MONOCYTES NFR BLD: 9.5 % (ref 4–15)
NEUTROPHILS # BLD AUTO: 2.9 K/UL (ref 1.8–7.7)
NEUTROPHILS NFR BLD: 80.4 % (ref 38–73)
NRBC BLD-RTO: 0 /100 WBC
PCO2 BLDA: 70.4 MMHG (ref 35–45)
PCO2 BLDA: 89.6 MMHG (ref 35–45)
PCO2 BLDA: 90.8 MMHG (ref 35–45)
PH SMN: 7.46 [PH] (ref 7.35–7.45)
PH SMN: 7.46 [PH] (ref 7.35–7.45)
PH SMN: 7.54 [PH] (ref 7.35–7.45)
PHOSPHATE SERPL-MCNC: 2.4 MG/DL (ref 2.7–4.5)
PLATELET # BLD AUTO: 108 K/UL (ref 150–350)
PMV BLD AUTO: 11.7 FL (ref 9.2–12.9)
PO2 BLDA: 33 MMHG (ref 40–60)
PO2 BLDA: 38 MMHG (ref 40–60)
PO2 BLDA: 69 MMHG (ref 80–100)
POC BE: >30 MMOL/L
POC SATURATED O2: 60 % (ref 95–100)
POC SATURATED O2: 74 % (ref 95–100)
POC SATURATED O2: 93 % (ref 95–100)
POC TCO2: >50 MMOL/L (ref 23–27)
POC TCO2: >50 MMOL/L (ref 24–29)
POC TCO2: >50 MMOL/L (ref 24–29)
POCT GLUCOSE: 305 MG/DL (ref 70–110)
POCT GLUCOSE: 366 MG/DL (ref 70–110)
POCT GLUCOSE: 380 MG/DL (ref 70–110)
POCT GLUCOSE: 392 MG/DL (ref 70–110)
POTASSIUM SERPL-SCNC: 3.4 MMOL/L (ref 3.5–5.1)
PROT SERPL-MCNC: 6.5 G/DL (ref 6–8.4)
RBC # BLD AUTO: 3.94 M/UL (ref 4–5.4)
SAMPLE: ABNORMAL
SITE: ABNORMAL
SODIUM SERPL-SCNC: 140 MMOL/L (ref 136–145)
SP02: 98
SPONT RATE: 19
SPONT RATE: 26
WBC # BLD AUTO: 3.59 K/UL (ref 3.9–12.7)

## 2019-04-18 PROCEDURE — 83735 ASSAY OF MAGNESIUM: CPT

## 2019-04-18 PROCEDURE — 85025 COMPLETE CBC W/AUTO DIFF WBC: CPT

## 2019-04-18 PROCEDURE — 36600 WITHDRAWAL OF ARTERIAL BLOOD: CPT

## 2019-04-18 PROCEDURE — 63600175 PHARM REV CODE 636 W HCPCS: Performed by: STUDENT IN AN ORGANIZED HEALTH CARE EDUCATION/TRAINING PROGRAM

## 2019-04-18 PROCEDURE — 82803 BLOOD GASES ANY COMBINATION: CPT

## 2019-04-18 PROCEDURE — 94640 AIRWAY INHALATION TREATMENT: CPT

## 2019-04-18 PROCEDURE — 94660 CPAP INITIATION&MGMT: CPT

## 2019-04-18 PROCEDURE — 94761 N-INVAS EAR/PLS OXIMETRY MLT: CPT

## 2019-04-18 PROCEDURE — 99223 1ST HOSP IP/OBS HIGH 75: CPT | Mod: ,,, | Performed by: INTERNAL MEDICINE

## 2019-04-18 PROCEDURE — 63600175 PHARM REV CODE 636 W HCPCS: Performed by: INTERNAL MEDICINE

## 2019-04-18 PROCEDURE — 25000003 PHARM REV CODE 250: Performed by: STUDENT IN AN ORGANIZED HEALTH CARE EDUCATION/TRAINING PROGRAM

## 2019-04-18 PROCEDURE — 25000242 PHARM REV CODE 250 ALT 637 W/ HCPCS: Performed by: STUDENT IN AN ORGANIZED HEALTH CARE EDUCATION/TRAINING PROGRAM

## 2019-04-18 PROCEDURE — 84100 ASSAY OF PHOSPHORUS: CPT

## 2019-04-18 PROCEDURE — 97161 PT EVAL LOW COMPLEX 20 MIN: CPT

## 2019-04-18 PROCEDURE — 80053 COMPREHEN METABOLIC PANEL: CPT

## 2019-04-18 PROCEDURE — 11000001 HC ACUTE MED/SURG PRIVATE ROOM

## 2019-04-18 PROCEDURE — 99900035 HC TECH TIME PER 15 MIN (STAT)

## 2019-04-18 PROCEDURE — 27000221 HC OXYGEN, UP TO 24 HOURS

## 2019-04-18 PROCEDURE — 36415 COLL VENOUS BLD VENIPUNCTURE: CPT

## 2019-04-18 PROCEDURE — 99223 PR INITIAL HOSPITAL CARE,LEVL III: ICD-10-PCS | Mod: ,,, | Performed by: INTERNAL MEDICINE

## 2019-04-18 RX ORDER — FUROSEMIDE 10 MG/ML
40 INJECTION INTRAMUSCULAR; INTRAVENOUS 2 TIMES DAILY
Status: DISCONTINUED | OUTPATIENT
Start: 2019-04-18 | End: 2019-04-18

## 2019-04-18 RX ADMIN — INSULIN ASPART 5 UNITS: 100 INJECTION, SOLUTION INTRAVENOUS; SUBCUTANEOUS at 12:04

## 2019-04-18 RX ADMIN — IPRATROPIUM BROMIDE AND ALBUTEROL SULFATE 3 ML: .5; 3 SOLUTION RESPIRATORY (INHALATION) at 07:04

## 2019-04-18 RX ADMIN — ASPIRIN 81 MG: 81 TABLET, COATED ORAL at 10:04

## 2019-04-18 RX ADMIN — IPRATROPIUM BROMIDE AND ALBUTEROL SULFATE 3 ML: .5; 3 SOLUTION RESPIRATORY (INHALATION) at 12:04

## 2019-04-18 RX ADMIN — DOXYCYCLINE HYCLATE 100 MG: 100 TABLET, COATED ORAL at 10:04

## 2019-04-18 RX ADMIN — IPRATROPIUM BROMIDE AND ALBUTEROL SULFATE 3 ML: .5; 3 SOLUTION RESPIRATORY (INHALATION) at 04:04

## 2019-04-18 RX ADMIN — PREDNISONE 40 MG: 20 TABLET ORAL at 10:04

## 2019-04-18 RX ADMIN — FUROSEMIDE 40 MG: 10 INJECTION, SOLUTION INTRAMUSCULAR; INTRAVENOUS at 10:04

## 2019-04-18 RX ADMIN — HYDROCHLOROTHIAZIDE 25 MG: 25 TABLET ORAL at 10:04

## 2019-04-18 RX ADMIN — ATORVASTATIN CALCIUM 20 MG: 20 TABLET, FILM COATED ORAL at 10:04

## 2019-04-18 RX ADMIN — INSULIN ASPART 4 UNITS: 100 INJECTION, SOLUTION INTRAVENOUS; SUBCUTANEOUS at 10:04

## 2019-04-18 RX ADMIN — FUROSEMIDE 40 MG: 10 INJECTION, SOLUTION INTRAMUSCULAR; INTRAVENOUS at 05:04

## 2019-04-18 RX ADMIN — ENOXAPARIN SODIUM 30 MG: 100 INJECTION SUBCUTANEOUS at 05:04

## 2019-04-18 RX ADMIN — INSULIN ASPART 4 UNITS: 100 INJECTION, SOLUTION INTRAVENOUS; SUBCUTANEOUS at 05:04

## 2019-04-18 RX ADMIN — MONTELUKAST SODIUM 10 MG: 10 TABLET, FILM COATED ORAL at 09:04

## 2019-04-18 RX ADMIN — DILTIAZEM HYDROCHLORIDE 180 MG: 180 CAPSULE, COATED, EXTENDED RELEASE ORAL at 10:04

## 2019-04-18 RX ADMIN — IPRATROPIUM BROMIDE AND ALBUTEROL SULFATE 3 ML: .5; 3 SOLUTION RESPIRATORY (INHALATION) at 03:04

## 2019-04-18 RX ADMIN — AMLODIPINE BESYLATE 10 MG: 10 TABLET ORAL at 10:04

## 2019-04-18 NOTE — CARE UPDATE
RAPID RESPONSE NURSE PROACTIVE ROUNDING NOTE     Time of Visit: 0900    Admit Date: 2019  LOS: 1  Code Status: Full Code   Date of Visit: 2019  : 1955  Age: 63 y.o.  Sex: female  Race: Black or   Bed: [facility] A:   MRN: [id number]  Was the patient discharged from an ICU this admission? no   Was the patient discharged from a PACU within last 24 hours?  no  Did the patient receive conscious sedation/general anesthesia in last 24 hours?  no  Was the patient in the ED within the past 24 hours?  no  Was the patient started on NIPPV within the past 24 hours?  no  Attending Physician: Junior Luevano MD  Primary Service: [facility] MED 1    ASSESSMENT     Abnormal Vital Signs: /87 (BP Location: Left arm, Patient Position: Lying)   Pulse 93   Temp 97.9 °F (36.6 °C) (Oral)   Resp 19   Wt 39.4 kg (86 lb 13.8 oz)   SpO2 (!) 92%   Breastfeeding? No    Clinical Issues: Circulatory    Patient awake and alert, pleasantly confused, oriented to self, NAD noted, offers no complaints at this time.     INTERVENTIONS/ RECOMMENDATIONS     VBG repeated, PCO2 89.6, down from 105 overnight, PRN VBG/ABG as needed and q4h neuro checks for AMS.    Discussed plan of care with RNVerónica [id number].    PHYSICIAN ESCALATION     Yes/No  yes    Orders received and case discussed with Dr. Mars.    Disposition: Remain in room 1160.    FOLLOW-UP     Call back the Rapid Response Nurse, Lydia Dallas RN at [phone] for additional questions or concerns.

## 2019-04-18 NOTE — ASSESSMENT & PLAN NOTE
U/S abdomen 01/2019- Liver normal in size and echotexture without evidence of focal lesion. The main portal vein is patent with hepatopedal flow. Common duct 2 mm in diameter. No intrahepatic biliary ductal dilatation. Gallbladder partially distended. Small amount of sludge in the gallbladder. No gallstones are seen. A trace amount of free fluid is seen adjacent to the gallbladder and liver. No abnormal gallbladder wall thickening. Right kidney within normal. Spleen within normal. 1 cm cyst at the upper pole of the left kidney. Left kidney otherwise within normal.    - unclear etiology,most likely due to her SLE.  - Chronic, Stable.

## 2019-04-18 NOTE — PLAN OF CARE
Problem: Physical Therapy Goal  Goal: Physical Therapy Goal  Goals to be met by: 2019     Patient will increase functional independence with mobility by performin. Sit to stand transfer with Supervision  2. Bed to chair transfer with Supervision using LRAD  3. Ambulate 50ft c LRAD Supervision  4. Ascend/descend 12 stair with bilateral Handrails Contact Guard Assistance  Outcome: Ongoing (interventions implemented as appropriate)  Eval completed and POC established    Liang Hagen PT,DPT  2019

## 2019-04-18 NOTE — ASSESSMENT & PLAN NOTE
"63 yro F with PMH of COPD (3 L home O2, home bipap qhs),  SLE, and HFpEF (echo 12/2018 dd) who presents to the ED 4/17/19 with SOB likely 2/2 COPD exacerbation from home bipap non-compliance, as well as component of CHF exacerbation.    -She is chronically hypercarbic with serum HCO3 47.    -OSH CT-Chest wo contrast in 02/2019:  Extensive bilateral centrilobular emphysema. Fibronodular changes of the bilateral lung apices, unchanged. Likely dependent atelectasis at the left lung base. The airways are clear.  No pleural effusion or thickening. No pneumothorax.No pericardial effusion.    -OSH PFT in 2017: + severe obstruction FEV1 44% of predicted "seen by Dr. aragon in 2017"    Plan:    1- Continue BiPAP - if VBG improved BIPAP Qhs on 15/5  2- goal sats 88-92%, providing more oxygen than necessary will worsen COPD exacerbation  3- Continue prednisone 40 mg daily for 5 days.  4- Continue doxycycline for total of 5 days.  5- resume home meds (spiriva, breo, singulair).  6- Continue lasix 40 mg IV daily.  7- Continue Duo-nebs Q4 scheduled for 1 day then prn.  8- Strict In/outs.          "

## 2019-04-18 NOTE — CARE UPDATE
RAPID RESPONSE NURSE PROACTIVE ROUNDING NOTE     Time of Visit: 0300    Admit Date: 2019  LOS: 1  Code Status: Full Code   Date of Visit: 2019  : 1955  Age: 63 y.o.  Sex: female  Race: Black or   Bed: 1160/1160 A:   MRN: [id number]  Was the patient discharged from an ICU this admission? no   Was the patient discharged from a PACU within last 24 hours?  no  Did the patient receive conscious sedation/general anesthesia in last 24 hours?  no  Was the patient in the ED within the past 24 hours?  yes  Was the patient started on NIPPV within the past 24 hours?  yes  Attending Physician: Junior Luevano MD  Primary Service: [facility] [place] [facility] MED 1    ASSESSMENT     Abnormal Vital Signs: /87 (BP Location: Left arm, Patient Position: Lying)   Pulse 86   Temp 98.8 °F (37.1 °C) (Oral)   Resp 19   Wt 39.6 kg (87 lb 4.8 oz)   SpO2 98%   Breastfeeding? No    Clinical Issues: Respiratory    AI alert received. Chart check completed, 130 VS as follows: HR 99, RR 19, SpO2 96% on BiPAP 50% and /87. Upon further review, notes reveal the following VBG results from : pH 7.539, PCO2 70.4, PO2 38, BE >30, HCO3 60, SaO2 74. Of note, ABG results from  show severe hypercarbia (PCO2 >100).     Upon arrival to room, patient asleep. VS as follows: HR 89, RR 20, SpO2 100% on 35% BiPAP, /85. Patient disoriented to time and situation, which is consistent with primary nurse's mentation assessment. No endorsement of SOB or chest pain at this time. NAD. IM1 MD Loera paged and notified of aforementioned information, recommendations to obtain PRN ABGs made - MD in agreement; orders to be placed. New orders relayed to primary nurse and RT. RRN to follow up.      INTERVENTIONS/ RECOMMENDATIONS     Closely monitor respiratory status and oxygen requirements and scheduled blood gases. Please notify of any changes in patient's condition.    Discussed plan of care with Anjelica DIXON.    PHYSICIAN  ESCALATION     Yes/No  yes    Orders received and case discussed with MD Evaristo with IM1, primary nurse, Anjelica, and RT, Jessica.    Disposition: Remain in room 1160.    FOLLOW-UP     Call back the Rapid Response Nurse, Ayah Byers RN at [phone] for additional questions or concerns.

## 2019-04-18 NOTE — PROGRESS NOTES
VBG done on 4/18/2019 @ 1772 results reported to Dr. Cuellar.    Ph: 7.539  PCO2: 70.4  PO2: 38  BE: >30  HCO3: 60  SaO2: 74

## 2019-04-18 NOTE — PLAN OF CARE
Problem: Adult Inpatient Plan of Care  Goal: Patient-Specific Goal (Individualization)  AOx1. Denies pain or discomfort at this time. Free of falls or injuries on shift. No acute distress noted. Family at bedside. [place]wick in place. No s/s pf hypo/hyperglycemia noted. Will continue to monitor

## 2019-04-18 NOTE — H&P
[facility] Hospital Medicine  History & Physical    Patient Name: Monica Strickland  MRN: [id number]  Admission Date: 4/17/2019  Attending Physician: Junior Luevano MD   Primary Care Provider: No primary care provider on file.    Hospital Medicine Team: [facility] [place] [facility] MED 1 Vy Cuellar MD     Patient information was obtained from patient, spouse/SO, past medical records and ER records.     Subjective:     Principal Problem:Acute on chronic respiratory failure with hypoxia and hypercapnia    Chief Complaint:   Chief Complaint   Patient presents with    Shortness of Breath     x 1 week         HPI: This is 63 yro F with PMH of COPD (3 L home O2, home bipap qhs), HTN, HTN, SLE, NIDDM2 (A1C 6.9), and HFpEF (echo 12/2018 dd) who presents to the ED 4/17/19 with SOB present for about the past week.     Per ED note, pt received DuoNebs x 3 en route to the ED and was placed on 6 L and O2 sats remained around 92%. Patient denies cough, sputum production, sick contacts, fevers, chills, CP. She states she has received her flu and PNA shots. She was recently discharged from the [facility] ICU 4/12/19 for acute on chronic hypercapnic respiratory failure due to COPD exacerbation with PCO2 on presentation of 143 and required intubation. At that time, she was noted to be non-compliant with her COPD medications. She was discharged with Spiriva, PRN duonebs, and breo.  reports she has been non-complaint with her qhs bipap, taking it off in the middle of the night about every other night.     In the ED, patient received duonebs and IV steroids. Her ABG at 16:28 showed pH of 7.30, CO2 121, O2 64, HCO3 60. Patient was then placed on bipap at 18/6 and 50%. Repeat blood gas about 1.5 hrs later showed pH 7.39, and CO2 in the 105. Significant labs in ED: serum [id number], . CXR showed congestion consistent with heart failure.     Pt was evaluated by MICU which felt thatis stable for care on the floor.     Social:  former smokrer with 35 years smoking hx quit 2014.        Past Medical History:   Diagnosis Date    COPD (chronic obstructive pulmonary disease)        History reviewed. No pertinent surgical history.    Review of patient's allergies indicates:  No Known Allergies    No current facility-administered medications on file prior to encounter.      No current outpatient medications on file prior to encounter.     Family History     None        Tobacco Use    Smoking status: Not on file   Substance and Sexual Activity    Alcohol use: Not on file    Drug use: Not on file    Sexual activity: Not on file     Review of Systems   Constitutional: Negative for activity change, appetite change, chills and fever.   HENT: Negative for congestion.    Eyes: Negative for pain and itching.   Respiratory: Positive for cough, shortness of breath and wheezing. Negative for chest tightness.    Cardiovascular: Negative for chest pain, palpitations and leg swelling.   Gastrointestinal: Negative for abdominal pain and nausea.   Endocrine: Negative for polyphagia and polyuria.   Genitourinary: Negative for dysuria and frequency.   Musculoskeletal: Negative for back pain.   Neurological: Negative for numbness and headaches.   Psychiatric/Behavioral: Negative for agitation and behavioral problems.     Objective:     Vital Signs (Most Recent):  Temp: 98.5 °F (36.9 °C) (04/17/19 1436)  Pulse: 94 (04/17/19 1801)  Resp: 15 (04/17/19 1648)  BP: (!) 143/82 (04/17/19 1801)  SpO2: (!) 92 % (04/17/19 1801) Vital Signs (24h Range):  Temp:  [98.5 °F (36.9 °C)] 98.5 °F (36.9 °C)  Pulse:  [] 94  Resp:  [14-37] 15  SpO2:  [82 %-100 %] 92 %  BP: (143-175)/(80-86) 143/82        There is no height or weight on file to calculate BMI.    Physical Exam   Constitutional: She is oriented to person, place, and time. She appears well-developed.   HENT:   Head: Normocephalic and atraumatic.   Neck: Normal range of motion. Neck supple.   Cardiovascular: Normal  rate, regular rhythm and normal heart sounds.   Pulmonary/Chest:   On bipap   Abdominal: Soft. Bowel sounds are normal.   Musculoskeletal: Normal range of motion. She exhibits no edema.   Neurological: She is alert and oriented to person, place, and time.   Skin: Skin is warm and dry.   Psychiatric: She has a normal mood and affect.           Significant Labs:   ABGs:   Recent Labs   Lab 04/17/19  1740   PH 7.398   PCO2 105.0*   HCO3 64.8*   POCSATURATED 92*   BE >30*     Blood Culture: No results for input(s): LABBLOO in the last 48 hours.  BMP:   Recent Labs   Lab 04/17/19  1457   *      K 4.3   CL 84*   CO2 47*   BUN 10   CREATININE 0.7   CALCIUM 9.3     CBC:   Recent Labs   Lab 04/17/19  1457   WBC 4.75   HGB 11.0*   HCT 39.6   PLT 82*     CMP:   Recent Labs   Lab 04/17/19  1457      K 4.3   CL 84*   CO2 47*   *   BUN 10   CREATININE 0.7   CALCIUM 9.3   ANIONGAP 13   EGFRNONAA >60.0     Coagulation: No results for input(s): PT, INR, APTT in the last 48 hours.  Lactic Acid: No results for input(s): LACTATE in the last 48 hours.  Lipase: No results for input(s): LIPASE in the last 48 hours.  Lipid Panel: No results for input(s): CHOL, HDL, LDLCALC, TRIG, CHOLHDL in the last 48 hours.  Magnesium: No results for input(s): MG in the last 48 hours.  POCT Glucose: No results for input(s): POCTGLUCOSE in the last 48 hours.  Prealbumin: No results for input(s): PREALBUMIN in the last 48 hours.  Respiratory Culture: No results for input(s): GSRESP, RESPIRATORYC in the last 48 hours.  Troponin:   Recent Labs   Lab 04/17/19  1457   TROPONINI 0.014     TSH: No results for input(s): TSH in the last 4320 hours.  Urine Culture: No results for input(s): LABURIN in the last 48 hours.  Urine Studies:   Recent Labs   Lab 04/17/19  1611   COLORU Yellow   APPEARANCEUA Cloudy*   PHUR 8.0   SPECGRAV 1.015   PROTEINUA Negative   GLUCUA 2+*   KETONESU Trace*   BILIRUBINUA Negative   OCCULTUA 1+*   NITRITE  "Negative   LEUKOCYTESUR Negative   RBCUA 15*   SQUAMEPITHEL 4     All pertinent labs within the past 24 hours have been reviewed.    Significant Imaging: I have reviewed and interpreted all pertinent imaging results/findings within the past 24 hours.    Assessment/Plan:     * Acute on chronic respiratory failure with hypoxia and hypercapnia  63 yro F with PMH of COPD (3 L home O2, home bipap qhs),  SLE, and HFpEF (echo 12/2018 dd) who presents to the ED 4/17/19 with SOB likely 2/2 COPD exacerbation from home bipap non-compliance, as well as component of CHF exacerbation.    -She is chronically hypercarbic with serum HCO3 47.    -OSH CT-Chest wo contrast in 02/2019:  Extensive bilateral centrilobular emphysema. Fibronodular changes of the bilateral lung apices, unchanged. Likely dependent atelectasis at the left lung base. The airways are clear.  No pleural effusion or thickening. No pneumothorax.No pericardial effusion.    -OSH PFT in 2017: + severe obstruction FEV1 44% of predicted "seen by Dr. aragon in 2017"    Plan:    1- Continue BiPAP - if VBG improved BIPAP Qhs on 15/5  2- goal sats 88-92%, providing more oxygen than necessary will worsen COPD exacerbation  3- Continue prednisone 40 mg daily for 5 days.  4- Continue doxycycline for total of 5 days.  5- resume home meds (spiriva, breo, singulair).  6- Continue lasix 40 mg IV daily.  7- Continue Duo-nebs Q4 scheduled for 1 day then prn.  8- Strict In/outs.            (HFpEF) heart failure with preserved ejection fraction  -12/2018 echo showed EF 50-55%, dd  -consider repeat echo in setting of hypoxia on admission and elevated BNP >400 (previously 100s)  -Continue lasix 40 mg IV daily.  - strict In/outs          Essential hypertension  -resume home anti-HTNsive medications  -home meds: HCTZ 25, Norvasc 10, diltiazem 180          Type 2 diabetes mellitus, without long-term current use of insulin  -Last A1c 6.9  -home meds: metformin 500 BID  -LDSSI  - POCT " "glucose.          Thrombocytopenia  U/S abdomen 01/2019- Liver normal in size and echotexture without evidence of focal lesion. The main portal vein is patent with hepatopedal flow. Common duct 2 mm in diameter. No intrahepatic biliary ductal dilatation. Gallbladder partially distended. Small amount of sludge in the gallbladder. No gallstones are seen. A trace amount of free fluid is seen adjacent to the gallbladder and liver. No abnormal gallbladder wall thickening. Right kidney within normal. Spleen within normal. 1 cm cyst at the upper pole of the left kidney. Left kidney otherwise within normal.    - unclear etiology,most likely due to her SLE.  - Chronic, Stable.       Systemic lupus erythematosus  -Per Care everywhere; Dr. Newsome dx her with Lupus 5 yrs ago, she was never on any medications for lupus. "EDD 1: 640 speckled pattern , with sm rnp + ve , leukopenia , fatigue , alopecia " She was seen once in rheumatology clinic in 2017.    Plan:  1- F/U EDD, DsDNA, anti sm/rnp, ESR and CRP.    Hyperlipidemia  - Continue Lipitor 20 mg daily.      Hematuria  - UA with 15 RBC.  - recommend urology outpt follow up.      VTE Risk Mitigation (From admission, onward)        Ordered     enoxaparin injection 30 mg  Daily      04/17/19 2551             [facility] Colin Cuellar MD  Department of Hospital Medicine   [facility]  "

## 2019-04-18 NOTE — CONSULTS
Pal care consulted to discuss hospice care. Pt is pleasant but confusion noted. Unable to have goals of care conversation with pt. Called pt's spouse No answer.  not at bedside. Will continue to try to contact pt's .     Called and spoke to Dr. Osuna concerning above conversation. Pal care will continue to follow.     Rosalinda ETIENNE, APRN, AGCNS

## 2019-04-18 NOTE — ASSESSMENT & PLAN NOTE
"-Per Care everywhere; Dr. Newsome dx her with Lupus 5 yrs ago, she was never on any medications for lupus. "EDD 1: 640 speckled pattern , with sm rnp + ve , leukopenia , fatigue , alopecia " She was seen once in rheumatology clinic in 2017.    Plan:  1- F/U EDD, DsDNA, anti sm/rnp, ESR and CRP.  "

## 2019-04-18 NOTE — SUBJECTIVE & OBJECTIVE
Past Medical History:   Diagnosis Date    COPD (chronic obstructive pulmonary disease)        History reviewed. No pertinent surgical history.    Review of patient's allergies indicates:  No Known Allergies    No current facility-administered medications on file prior to encounter.      No current outpatient medications on file prior to encounter.     Family History     None        Tobacco Use    Smoking status: Not on file   Substance and Sexual Activity    Alcohol use: Not on file    Drug use: Not on file    Sexual activity: Not on file     Review of Systems   Constitutional: Negative for activity change, appetite change, chills and fever.   HENT: Negative for congestion.    Eyes: Negative for pain and itching.   Respiratory: Positive for cough, shortness of breath and wheezing. Negative for chest tightness.    Cardiovascular: Negative for chest pain, palpitations and leg swelling.   Gastrointestinal: Negative for abdominal pain and nausea.   Endocrine: Negative for polyphagia and polyuria.   Genitourinary: Negative for dysuria and frequency.   Musculoskeletal: Negative for back pain.   Neurological: Negative for numbness and headaches.   Psychiatric/Behavioral: Negative for agitation and behavioral problems.     Objective:     Vital Signs (Most Recent):  Temp: 98.5 °F (36.9 °C) (04/17/19 1436)  Pulse: 94 (04/17/19 1801)  Resp: 15 (04/17/19 1648)  BP: (!) 143/82 (04/17/19 1801)  SpO2: (!) 92 % (04/17/19 1801) Vital Signs (24h Range):  Temp:  [98.5 °F (36.9 °C)] 98.5 °F (36.9 °C)  Pulse:  [] 94  Resp:  [14-37] 15  SpO2:  [82 %-100 %] 92 %  BP: (143-175)/(80-86) 143/82        There is no height or weight on file to calculate BMI.    Physical Exam   Constitutional: She is oriented to person, place, and time. She appears well-developed.   HENT:   Head: Normocephalic and atraumatic.   Neck: Normal range of motion. Neck supple.   Cardiovascular: Normal rate, regular rhythm and normal heart sounds.    Pulmonary/Chest:   On bipap   Abdominal: Soft. Bowel sounds are normal.   Musculoskeletal: Normal range of motion. She exhibits no edema.   Neurological: She is alert and oriented to person, place, and time.   Skin: Skin is warm and dry.   Psychiatric: She has a normal mood and affect.           Significant Labs:   ABGs:   Recent Labs   Lab 04/17/19  1740   PH 7.398   PCO2 105.0*   HCO3 64.8*   POCSATURATED 92*   BE >30*     Blood Culture: No results for input(s): LABBLOO in the last 48 hours.  BMP:   Recent Labs   Lab 04/17/19  1457   *      K 4.3   CL 84*   CO2 47*   BUN 10   CREATININE 0.7   CALCIUM 9.3     CBC:   Recent Labs   Lab 04/17/19  1457   WBC 4.75   HGB 11.0*   HCT 39.6   PLT 82*     CMP:   Recent Labs   Lab 04/17/19  1457      K 4.3   CL 84*   CO2 47*   *   BUN 10   CREATININE 0.7   CALCIUM 9.3   ANIONGAP 13   EGFRNONAA >60.0     Coagulation: No results for input(s): PT, INR, APTT in the last 48 hours.  Lactic Acid: No results for input(s): LACTATE in the last 48 hours.  Lipase: No results for input(s): LIPASE in the last 48 hours.  Lipid Panel: No results for input(s): CHOL, HDL, LDLCALC, TRIG, CHOLHDL in the last 48 hours.  Magnesium: No results for input(s): MG in the last 48 hours.  POCT Glucose: No results for input(s): POCTGLUCOSE in the last 48 hours.  Prealbumin: No results for input(s): PREALBUMIN in the last 48 hours.  Respiratory Culture: No results for input(s): GSRESP, RESPIRATORYC in the last 48 hours.  Troponin:   Recent Labs   Lab 04/17/19  1457   TROPONINI 0.014     TSH: No results for input(s): TSH in the last 4320 hours.  Urine Culture: No results for input(s): LABURIN in the last 48 hours.  Urine Studies:   Recent Labs   Lab 04/17/19  1611   COLORU Yellow   APPEARANCEUA Cloudy*   PHUR 8.0   SPECGRAV 1.015   PROTEINUA Negative   GLUCUA 2+*   KETONESU Trace*   BILIRUBINUA Negative   OCCULTUA 1+*   NITRITE Negative   LEUKOCYTESUR Negative   RBCUA 15*    DILCIA Sahu     All pertinent labs within the past 24 hours have been reviewed.    Significant Imaging: I have reviewed and interpreted all pertinent imaging results/findings within the past 24 hours.

## 2019-04-18 NOTE — PROGRESS NOTES
Patient on BIPAP 15/5 35%. ABG done and results shown to Dr. Cuellar. BIPAP settings changed to 20/5 35% per Doctor.

## 2019-04-18 NOTE — PT/OT/SLP EVAL
"Physical Therapy Evaluation    Patient Name:  Monica Strickland   MRN:  [id number]    Recommendations:     Discharge Recommendations:  home with home health   Discharge Equipment Recommendations: walker, rolling   Barriers to discharge: Inaccessible home    Assessment:     Monica Strickland is a 63 y.o. female admitted with a medical diagnosis of Acute on chronic respiratory failure with hypoxia and hypercapnia.  She presents with the following impairments/functional limitations:  weakness, impaired self care skills, impaired functional mobilty, impaired endurance, gait instability, impaired balance, impaired cognition, pain, impaired cardiopulmonary response to activity, decreased safety awareness.  Pt demo decreased functional mobility secondary to weakness.  Pt requiring frequent redirecting during session and inconsistent in history provided.  Pt performed bed mobility c S; transfer/gait assessment deferred d/t refusal.  Pt demo good sitting balance requiring S while sitting EOB.  Pt would benefit from continued skilled acute PT 3x/wk to improve functional mobility.  Recommending pt receive PT services in HH setting following d/c from hospital once medically cleared.      Rehab Prognosis: Good; patient would benefit from acute skilled PT services to address these deficits and reach maximum level of function.    Recent Surgery: * No surgery found *      Plan:     During this hospitalization, patient to be seen 3 x/week to address the identified rehab impairments via gait training, therapeutic activities, therapeutic exercises, neuromuscular re-education and progress toward the following goals:    · Plan of Care Expires:  05/13/19    Subjective     Chief Complaint: back pain  Patient/Family Comments/goals: "I need to use the restroom." "Oh, I have a diaper on. Never mind."  Pain/Comfort:  · Pain Rating 1: (reports back pain but did not rate)    Patients cultural, spiritual, Nondenominational conflicts given the current situation: " no    Living Environment:  History obtain from  via telephone following evaluation. Pt lives c , son and daughter in 2SH c 0STE.  Pt's living quarters on 2nd floor c 1 flight of stairs and BHR.  PTA pt reports 5x falls but  reports no falls.  Prior to admission, patients level of function was requiring assistance c ADLs and mobility -  states when pt following breathing treatment and bipap she functions better.  Equipment used at home: oxygen, rollator, BIPAP.  DME owned (not currently used): none.  Upon discharge, patient will have assistance from family.    Objective:     Communicated with RN prior to session.  Patient found HOB elevated with peripheral IV, telemetry, bed alarm  upon PT entry to room.    General Precautions: Standard, fall   Orthopedic Precautions:N/A   Braces: N/A     Exams:  · Cognitive Exam:  Patient is oriented to Person and Place  · RLE ROM: WFL  · RLE Strength: grossly 4/5  · LLE ROM: WFL  · LLE Strength: grossly 4/5    Functional Mobility:  · Bed Mobility:     · Rolling Left:  supervision  · Scooting: supervision  · Supine to Sit: supervision  · Sit to Supine: supervision  · Balance: sitting (S)      Therapeutic Activities and Exercises:  Pt educated on: PT role/POC; safety c mobility; benefits of OOB activities; performing therex; d/c recs - v/u      AM-PAC 6 CLICK MOBILITY  Total Score:14     Patient left HOB elevated with all lines intact, call button in reach, bed alarm on and RN notified.    GOALS:   Multidisciplinary Problems     Physical Therapy Goals        Problem: Physical Therapy Goal    Goal Priority Disciplines Outcome Goal Variances Interventions   Physical Therapy Goal     PT, PT/OT Ongoing (interventions implemented as appropriate)     Description:  Goals to be met by: 2019     Patient will increase functional independence with mobility by performin. Sit to stand transfer with Supervision  2. Bed to chair transfer with Supervision  using LRAD  3. Ambulate 50ft c LRAD Supervision  4. Ascend/descend 12 stair with bilateral Handrails Contact Guard Assistance                    History:     Past Medical History:   Diagnosis Date    COPD (chronic obstructive pulmonary disease)        History reviewed. No pertinent surgical history.    Time Tracking:     PT Received On: 04/18/19  PT Start Time: 1043     PT Stop Time: 1051  PT Total Time (min): 8 min     Billable Minutes: Evaluation 8 min      Liang Hagen, PT  04/18/2019

## 2019-04-18 NOTE — ASSESSMENT & PLAN NOTE
-12/2018 echo showed EF 50-55%, dd  -consider repeat echo in setting of hypoxia on admission and elevated BNP >400 (previously 100s)  -Continue lasix 40 mg IV daily.  - strict In/outs

## 2019-04-18 NOTE — HPI
This is 63 yro F with PMH of COPD (3 L home O2, home bipap qhs), HTN, HTN, SLE, NIDDM2 (A1C 6.9), and HFpEF (echo 12/2018 dd) who presents to the ED 4/17/19 with SOB present for about the past week.     Per ED note, pt received DuoNebs x 3 en route to the ED and was placed on 6 L and O2 sats remained around 92%. Patient denies cough, sputum production, sick contacts, fevers, chills, CP. She states she has received her flu and PNA shots. She was recently discharged from the [facility] ICU 4/12/19 for acute on chronic hypercapnic respiratory failure due to COPD exacerbation with PCO2 on presentation of 143 and required intubation. At that time, she was noted to be non-compliant with her COPD medications. She was discharged with Spiriva, PRN duonebs, and breo.  reports she has been non-complaint with her qhs bipap, taking it off in the middle of the night about every other night.     In the ED, patient received duonebs and IV steroids. Her ABG at 16:28 showed pH of 7.30, CO2 121, O2 64, HCO3 60. Patient was then placed on bipap at 18/6 and 50%. Repeat blood gas about 1.5 hrs later showed pH 7.39, and CO2 in the 105. Significant labs in ED: serum [id number], . CXR showed congestion consistent with heart failure.     Pt was evaluated by MICU which felt thatis stable for care on the floor.     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

## 2019-04-19 PROBLEM — Z71.89 GOALS OF CARE, COUNSELING/DISCUSSION: Status: ACTIVE | Noted: 2019-04-19

## 2019-04-19 LAB
ALBUMIN SERPL BCP-MCNC: 3.6 G/DL (ref 3.5–5.2)
ALP SERPL-CCNC: 72 U/L (ref 55–135)
ALT SERPL W/O P-5'-P-CCNC: 17 U/L (ref 10–44)
ANION GAP SERPL CALC-SCNC: 19 MMOL/L (ref 8–16)
AST SERPL-CCNC: 20 U/L (ref 10–40)
BASOPHILS # BLD AUTO: 0.01 K/UL (ref 0–0.2)
BASOPHILS NFR BLD: 0.1 % (ref 0–1.9)
BILIRUB SERPL-MCNC: 0.8 MG/DL (ref 0.1–1)
BUN SERPL-MCNC: 30 MG/DL (ref 8–23)
CALCIUM SERPL-MCNC: 10.2 MG/DL (ref 8.7–10.5)
CHLORIDE SERPL-SCNC: 70 MMOL/L (ref 95–110)
CO2 SERPL-SCNC: 49 MMOL/L (ref 23–29)
CREAT SERPL-MCNC: 0.8 MG/DL (ref 0.5–1.4)
DIFFERENTIAL METHOD: ABNORMAL
EOSINOPHIL # BLD AUTO: 0 K/UL (ref 0–0.5)
EOSINOPHIL NFR BLD: 0 % (ref 0–8)
ERYTHROCYTE [DISTWIDTH] IN BLOOD BY AUTOMATED COUNT: 13.2 % (ref 11.5–14.5)
EST. GFR  (AFRICAN AMERICAN): >60 ML/MIN/1.73 M^2
EST. GFR  (NON AFRICAN AMERICAN): >60 ML/MIN/1.73 M^2
GLUCOSE SERPL-MCNC: 140 MG/DL (ref 70–110)
GLUCOSE SERPL-MCNC: 503 MG/DL (ref 70–110)
HCT VFR BLD AUTO: 39.8 % (ref 37–48.5)
HGB BLD-MCNC: 12 G/DL (ref 12–16)
IMM GRANULOCYTES # BLD AUTO: 0.04 K/UL (ref 0–0.04)
IMM GRANULOCYTES NFR BLD AUTO: 0.5 % (ref 0–0.5)
LYMPHOCYTES # BLD AUTO: 1.3 K/UL (ref 1–4.8)
LYMPHOCYTES NFR BLD: 15.9 % (ref 18–48)
MAGNESIUM SERPL-MCNC: 1.8 MG/DL (ref 1.6–2.6)
MCH RBC QN AUTO: 27.8 PG (ref 27–31)
MCHC RBC AUTO-ENTMCNC: 30.2 G/DL (ref 32–36)
MCV RBC AUTO: 92 FL (ref 82–98)
MONOCYTES # BLD AUTO: 1.3 K/UL (ref 0.3–1)
MONOCYTES NFR BLD: 16 % (ref 4–15)
NEUTROPHILS # BLD AUTO: 5.4 K/UL (ref 1.8–7.7)
NEUTROPHILS NFR BLD: 67.5 % (ref 38–73)
NRBC BLD-RTO: 0 /100 WBC
PHOSPHATE SERPL-MCNC: 2.2 MG/DL (ref 2.7–4.5)
PLATELET # BLD AUTO: 163 K/UL (ref 150–350)
PMV BLD AUTO: 12 FL (ref 9.2–12.9)
POCT GLUCOSE: 199 MG/DL (ref 70–110)
POCT GLUCOSE: 216 MG/DL (ref 70–110)
POCT GLUCOSE: 341 MG/DL (ref 70–110)
POCT GLUCOSE: 428 MG/DL (ref 70–110)
POTASSIUM SERPL-SCNC: 3.9 MMOL/L (ref 3.5–5.1)
PROT SERPL-MCNC: 7.1 G/DL (ref 6–8.4)
RBC # BLD AUTO: 4.31 M/UL (ref 4–5.4)
SODIUM SERPL-SCNC: 138 MMOL/L (ref 136–145)
WBC # BLD AUTO: 8.01 K/UL (ref 3.9–12.7)

## 2019-04-19 PROCEDURE — 94660 CPAP INITIATION&MGMT: CPT

## 2019-04-19 PROCEDURE — 85025 COMPLETE CBC W/AUTO DIFF WBC: CPT

## 2019-04-19 PROCEDURE — 63600175 PHARM REV CODE 636 W HCPCS: Performed by: STUDENT IN AN ORGANIZED HEALTH CARE EDUCATION/TRAINING PROGRAM

## 2019-04-19 PROCEDURE — 11000001 HC ACUTE MED/SURG PRIVATE ROOM

## 2019-04-19 PROCEDURE — S5571 INSULIN DISPOS PEN 3 ML: HCPCS | Performed by: STUDENT IN AN ORGANIZED HEALTH CARE EDUCATION/TRAINING PROGRAM

## 2019-04-19 PROCEDURE — 25000003 PHARM REV CODE 250: Performed by: INTERNAL MEDICINE

## 2019-04-19 PROCEDURE — 84100 ASSAY OF PHOSPHORUS: CPT

## 2019-04-19 PROCEDURE — 99900035 HC TECH TIME PER 15 MIN (STAT)

## 2019-04-19 PROCEDURE — 94761 N-INVAS EAR/PLS OXIMETRY MLT: CPT

## 2019-04-19 PROCEDURE — 36415 COLL VENOUS BLD VENIPUNCTURE: CPT

## 2019-04-19 PROCEDURE — 25000003 PHARM REV CODE 250: Performed by: STUDENT IN AN ORGANIZED HEALTH CARE EDUCATION/TRAINING PROGRAM

## 2019-04-19 PROCEDURE — 99232 SBSQ HOSP IP/OBS MODERATE 35: CPT | Mod: ,,, | Performed by: INTERNAL MEDICINE

## 2019-04-19 PROCEDURE — 99232 PR SUBSEQUENT HOSPITAL CARE,LEVL II: ICD-10-PCS | Mod: ,,, | Performed by: INTERNAL MEDICINE

## 2019-04-19 PROCEDURE — 25000242 PHARM REV CODE 250 ALT 637 W/ HCPCS: Performed by: STUDENT IN AN ORGANIZED HEALTH CARE EDUCATION/TRAINING PROGRAM

## 2019-04-19 PROCEDURE — 83735 ASSAY OF MAGNESIUM: CPT

## 2019-04-19 PROCEDURE — 82947 ASSAY GLUCOSE BLOOD QUANT: CPT

## 2019-04-19 PROCEDURE — 80053 COMPREHEN METABOLIC PANEL: CPT

## 2019-04-19 PROCEDURE — 27000221 HC OXYGEN, UP TO 24 HOURS

## 2019-04-19 RX ORDER — POTASSIUM CHLORIDE 20 MEQ/15ML
40 SOLUTION ORAL ONCE
Status: COMPLETED | OUTPATIENT
Start: 2019-04-19 | End: 2019-04-19

## 2019-04-19 RX ADMIN — FLUTICASONE FUROATE AND VILANTEROL TRIFENATATE 1 PUFF: 200; 25 POWDER RESPIRATORY (INHALATION) at 09:04

## 2019-04-19 RX ADMIN — MONTELUKAST SODIUM 10 MG: 10 TABLET, FILM COATED ORAL at 09:04

## 2019-04-19 RX ADMIN — ASPIRIN 81 MG: 81 TABLET, COATED ORAL at 09:04

## 2019-04-19 RX ADMIN — AMLODIPINE BESYLATE 10 MG: 10 TABLET ORAL at 09:04

## 2019-04-19 RX ADMIN — INSULIN ASPART 3 UNITS: 100 INJECTION, SOLUTION INTRAVENOUS; SUBCUTANEOUS at 09:04

## 2019-04-19 RX ADMIN — INSULIN DETEMIR 4 UNITS: 100 INJECTION, SOLUTION SUBCUTANEOUS at 10:04

## 2019-04-19 RX ADMIN — INSULIN ASPART 4 UNITS: 100 INJECTION, SOLUTION INTRAVENOUS; SUBCUTANEOUS at 05:04

## 2019-04-19 RX ADMIN — DOXYCYCLINE HYCLATE 100 MG: 100 TABLET, COATED ORAL at 09:04

## 2019-04-19 RX ADMIN — DOXYCYCLINE HYCLATE 100 MG: 100 TABLET, COATED ORAL at 08:04

## 2019-04-19 RX ADMIN — PREDNISONE 40 MG: 20 TABLET ORAL at 09:04

## 2019-04-19 RX ADMIN — TIOTROPIUM BROMIDE 18 MCG: 18 CAPSULE ORAL; RESPIRATORY (INHALATION) at 09:04

## 2019-04-19 RX ADMIN — POTASSIUM CHLORIDE 40 MEQ: 20 SOLUTION ORAL at 10:04

## 2019-04-19 RX ADMIN — DILTIAZEM HYDROCHLORIDE 180 MG: 180 CAPSULE, COATED, EXTENDED RELEASE ORAL at 09:04

## 2019-04-19 RX ADMIN — ENOXAPARIN SODIUM 30 MG: 100 INJECTION SUBCUTANEOUS at 05:04

## 2019-04-19 RX ADMIN — ATORVASTATIN CALCIUM 20 MG: 20 TABLET, FILM COATED ORAL at 09:04

## 2019-04-19 RX ADMIN — INSULIN ASPART 2 UNITS: 100 INJECTION, SOLUTION INTRAVENOUS; SUBCUTANEOUS at 12:04

## 2019-04-19 NOTE — ASSESSMENT & PLAN NOTE
Does not appear that patient is capable of grasping the severity of her medical condition.   Palliative consulted for assistance with goals of care and potentially transitioning to home hospice care  So far unable to reach family  Will f/up with palliative care and continue to reach out to family

## 2019-04-19 NOTE — ASSESSMENT & PLAN NOTE
"-Per Care everywhere; Dr. Newsome dx her with Lupus 5 yrs ago, she was never on any medications for lupus. "EDD 1: 640 speckled pattern , with sm rnp + ve , leukopenia , fatigue , alopecia " She was seen once in rheumatology clinic in 2017.    Plan:  1- EDD, DsDNA- NEG  2- anti sm/rnp- pending  3- ESR wnl and CRP elevated  4- no active issues  "

## 2019-04-19 NOTE — PLAN OF CARE
Problem: Adult Inpatient Plan of Care  Goal: Patient-Specific Goal (Individualization)  AOx2-3. Denies pain or discomfort at this time. Tele-siter in place.Free of falls or injuries on shift. No acute distress noted.  Purwick in place. No s/s of hypo/hyperglycemia noted. Call light in reach, bed lowered, and safety maintained. Will continue to monitor

## 2019-04-19 NOTE — HOSPITAL COURSE
Admitted to  for acute on chronic respiratory failure with hypercapnia. Mentation and pH improved after tx with BIPAP. Palliative care consulted for assistance with goals of care discussion given progression of disease with recurrent hospital admissions and medication and BIPAP noncompliance at home. However, palliative and team unable to reach family. On day 2 pt tolerated BIPAP overnight, found to be HD stable with sats at goal on 3L NC (baseline). Stable for discharge 4/20. Patient intermittently cooperative with BiPAP during hospital stay. Overall given number of recent hospitalizations for respiratory distress and overall patient's lack of acceptance of severity of disease (COPD and HFpEF) and subsequent non-compliance with medical therapy (LABA/lAMA/ICS and BiPAP for COPD), patient is a very high risk of readmission.

## 2019-04-19 NOTE — PROGRESS NOTES
[facility] Hospital Medicine  Progress Note    Patient Name: Monica Strickland  MRN: [id number]  Patient Class: IP- Inpatient   Admission Date: 4/17/2019  Length of Stay: 2 days  Attending Physician: Juniro Luevano MD  Primary Care Provider: [facility] Medicine Team: [facility] HOSP MED 1 Brianne Anderson MD    Subjective:     Principal Problem:Acute on chronic respiratory failure with hypoxia and hypercapnia    HPI:  This is 63 yro F with PMH of COPD (3 L home O2, home bipap qhs), HTN, HTN, SLE, NIDDM2 (A1C 6.9), and HFpEF (echo 12/2018 dd) who presents to the ED 4/17/19 with SOB present for about the past week.     Per ED note, pt received DuoNebs x 3 en route to the ED and was placed on 6 L and O2 sats remained around 92%. Patient denies cough, sputum production, sick contacts, fevers, chills, CP. She states she has received her flu and PNA shots. She was recently discharged from the [facility] ICU 4/12/19 for acute on chronic hypercapnic respiratory failure due to COPD exacerbation with PCO2 on presentation of 143 and required intubation. At that time, she was noted to be non-compliant with her COPD medications. She was discharged with Spiriva, PRN duonebs, and breo.  reports she has been non-complaint with her qhs bipap, taking it off in the middle of the night about every other night.     In the ED, patient received duonebs and IV steroids. Her ABG at 16:28 showed pH of 7.30, CO2 121, O2 64, HCO3 60. Patient was then placed on bipap at 18/6 and 50%. Repeat blood gas about 1.5 hrs later showed pH 7.39, and CO2 in the 105. Significant labs in ED: serum [id number], . CXR showed congestion consistent with heart failure.     Pt was evaluated by MICU which felt thatis stable for care on the floor.     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        Hospital Course:  Admitted to  for acute on chronic respiratory failure with hypercapnia. Mentation and pH  improved after tx with BIPAP. Palliative care consulted for assistance with goals of care discussion given progression of disease with recurrent hospital admissions and medication and BIPAP noncompliance at home. However, palliative and team unable to reach family. On day 2 pt tolerated BIPAP overnight, found to be HD stable with sats at goal on 3L NC (baseline).     Interval History: NAEON. HD stable today. No complaints.    Review of Systems   Constitutional: Negative for activity change, appetite change, chills and fever.   HENT: Negative for congestion.    Eyes: Negative for pain and itching.   Respiratory: Negative for cough, chest tightness, shortness of breath and wheezing.    Cardiovascular: Negative for chest pain and leg swelling.   Gastrointestinal: Negative for abdominal pain and nausea.   Endocrine: Negative for polyphagia and polyuria.   Genitourinary: Negative for dysuria and frequency.   Musculoskeletal: Negative for back pain.   Neurological: Negative for numbness and headaches.   Psychiatric/Behavioral: Negative for agitation and behavioral problems.     Objective:     Vital Signs (Most Recent):  Temp: 98.6 °F (37 °C) (04/19/19 0501)  Pulse: 87 (04/19/19 1230)  Resp: 20 (04/19/19 1230)  BP: 108/67 (04/19/19 1230)  SpO2: 99 % (04/19/19 1230) Vital Signs (24h Range):  Temp:  [98.6 °F (37 °C)] 98.6 °F (37 °C)  Pulse:  [64-97] 87  Resp:  [18-22] 20  SpO2:  [97 %-100 %] 99 %  BP: (106-126)/(60-80) 108/67     Weight: 38.5 kg (84 lb 14 oz)  There is no height or weight on file to calculate BMI.    Intake/Output Summary (Last 24 hours) at 4/19/2019 1343  Last data filed at 4/19/2019 0900  Gross per 24 hour   Intake 140 ml   Output 250 ml   Net -110 ml      Physical Exam   Constitutional: She appears well-developed. No distress.   frail   HENT:   Head: Normocephalic and atraumatic.   Eyes: Pupils are equal, round, and reactive to light. EOM are normal.   Neck: Normal range of motion. Neck supple.  "  Cardiovascular: Normal rate, regular rhythm and normal heart sounds.   Pulmonary/Chest: Effort normal and breath sounds normal. No respiratory distress.   Abdominal: Soft. Bowel sounds are normal. She exhibits no distension.   Musculoskeletal: Normal range of motion. She exhibits no edema or tenderness.   Neurological: She is alert.   Not oriented to situation   Skin: Skin is warm and dry. No erythema.   Psychiatric: She has a normal mood and affect. Her behavior is normal.       Significant Labs:   Urine Studies:   Recent Labs   Lab 04/17/19  1611   COLORU Yellow   APPEARANCEUA Cloudy*   PHUR 8.0   SPECGRAV 1.015   PROTEINUA Negative   GLUCUA 2+*   KETONESU Trace*   BILIRUBINUA Negative   OCCULTUA 1+*   NITRITE Negative   LEUKOCYTESUR Negative   RBCUA 15*   SQUAMEPITHEL 4       Significant Imaging: I have reviewed and interpreted all pertinent imaging results/findings within the past 24 hours.    Assessment/Plan:      * Acute on chronic respiratory failure with hypoxia and hypercapnia  63 yro F with PMH of COPD (3 L home O2, home bipap qhs),  SLE, and HFpEF (echo 12/2018 dd) who presents to the ED 4/17/19 with SOB likely 2/2 COPD exacerbation from home bipap non-compliance, as well as component of CHF exacerbation.    -She is chronically hypercarbic with serum HCO3 47.    -OSH CT-Chest wo contrast in 02/2019:  Extensive bilateral centrilobular emphysema. Fibronodular changes of the bilateral lung apices, unchanged. Likely dependent atelectasis at the left lung base. The airways are clear.  No pleural effusion or thickening. No pneumothorax.No pericardial effusion.    -OSH PFT in 2017: + severe obstruction FEV1 44% of predicted "seen by Dr. aragon in 2017"    Plan:    1- Continue BiPAP qhs  2- goal sats 88-92%, providing more oxygen than necessary will worsen COPD exacerbation  3- Continue prednisone 40 mg daily for 5 days.  4- Continue doxycycline for total of 5 days.  5- resume home meds (spiriva, breo, " "singulair).  6- Duo-nebs PRN  7-DC lasix given contraction alkalosis w hypochloremia          Goals of care, counseling/discussion  Does not appear that patient is capable of grasping the severity of her medical condition.   Palliative consulted for assistance with goals of care and potentially transitioning to home hospice care  So far unable to reach family  Will f/up with palliative care and continue to reach out to family      Hematuria  - UA with 15 RBC.  - recommend urology outpt follow up.    Thrombocytopenia  U/S abdomen 01/2019- Liver normal in size and echotexture without evidence of focal lesion. The main portal vein is patent with hepatopedal flow. Common duct 2 mm in diameter. No intrahepatic biliary ductal dilatation. Gallbladder partially distended. Small amount of sludge in the gallbladder. No gallstones are seen. A trace amount of free fluid is seen adjacent to the gallbladder and liver. No abnormal gallbladder wall thickening. Right kidney within normal. Spleen within normal. 1 cm cyst at the upper pole of the left kidney. Left kidney otherwise within normal.    - unclear etiology,most likely due to her SLE.  - Chronic, Stable.       Systemic lupus erythematosus  -Per Care everywhere; Dr. Newsome dx her with Lupus 5 yrs ago, she was never on any medications for lupus. "EDD 1: 640 speckled pattern , with sm rnp + ve , leukopenia , fatigue , alopecia " She was seen once in rheumatology clinic in 2017.    Plan:  1- EDD, DsDNA- NEG  2- anti sm/rnp- pending  3- ESR wnl and CRP elevated  4- no active issues    Hyperlipidemia  - Continue Lipitor 20 mg daily.      Type 2 diabetes mellitus, without long-term current use of insulin  -Last A1c 6.9  -home meds: metformin 500 BID  -LDSSI  - POCT glucose.          Essential hypertension  -resume home anti-HTNsive medications  -home meds: HCTZ 25, Norvasc 10, diltiazem 180          (HFpEF) heart failure with preserved ejection fraction  -12/2018 echo showed EF " 50-55%, dd  -consider repeat echo in setting of hypoxia on admission and elevated BNP >400 (previously 100s)  -DC lasix  - strict In/outs            VTE Risk Mitigation (From admission, onward)        Ordered     enoxaparin injection 30 mg  Daily      04/17/19 7550              Brianne Anderson MD  Department of Hospital Medicine   [facility]

## 2019-04-19 NOTE — ASSESSMENT & PLAN NOTE
"63 yro F with PMH of COPD (3 L home O2, home bipap qhs),  SLE, and HFpEF (echo 12/2018 dd) who presents to the ED 4/17/19 with SOB likely 2/2 COPD exacerbation from home bipap non-compliance, as well as component of CHF exacerbation.    -She is chronically hypercarbic with serum HCO3 47.    -OSH CT-Chest wo contrast in 02/2019:  Extensive bilateral centrilobular emphysema. Fibronodular changes of the bilateral lung apices, unchanged. Likely dependent atelectasis at the left lung base. The airways are clear.  No pleural effusion or thickening. No pneumothorax.No pericardial effusion.    -OSH PFT in 2017: + severe obstruction FEV1 44% of predicted "seen by Dr. aragon in 2017"    Plan:    1- Continue BiPAP qhs  2- goal sats 88-92%, providing more oxygen than necessary will worsen COPD exacerbation  3- Continue prednisone 40 mg daily for 5 days.  4- Continue doxycycline for total of 5 days.  5- resume home meds (spiriva, breo, singulair).  6- Duo-nebs PRN  7-DC lasix given contraction alkalosis w hypochloremia        "

## 2019-04-19 NOTE — SUBJECTIVE & OBJECTIVE
Interval History: NAEON. HD stable today. No complaints.    Review of Systems   Constitutional: Negative for activity change, appetite change, chills and fever.   HENT: Negative for congestion.    Eyes: Negative for pain and itching.   Respiratory: Negative for cough, chest tightness, shortness of breath and wheezing.    Cardiovascular: Negative for chest pain and leg swelling.   Gastrointestinal: Negative for abdominal pain and nausea.   Endocrine: Negative for polyphagia and polyuria.   Genitourinary: Negative for dysuria and frequency.   Musculoskeletal: Negative for back pain.   Neurological: Negative for numbness and headaches.   Psychiatric/Behavioral: Negative for agitation and behavioral problems.     Objective:     Vital Signs (Most Recent):  Temp: 98.6 °F (37 °C) (04/19/19 0501)  Pulse: 87 (04/19/19 1230)  Resp: 20 (04/19/19 1230)  BP: 108/67 (04/19/19 1230)  SpO2: 99 % (04/19/19 1230) Vital Signs (24h Range):  Temp:  [98.6 °F (37 °C)] 98.6 °F (37 °C)  Pulse:  [64-97] 87  Resp:  [18-22] 20  SpO2:  [97 %-100 %] 99 %  BP: (106-126)/(60-80) 108/67     Weight: 38.5 kg (84 lb 14 oz)  There is no height or weight on file to calculate BMI.    Intake/Output Summary (Last 24 hours) at 4/19/2019 1343  Last data filed at 4/19/2019 0900  Gross per 24 hour   Intake 140 ml   Output 250 ml   Net -110 ml      Physical Exam   Constitutional: She appears well-developed. No distress.   frail   HENT:   Head: Normocephalic and atraumatic.   Eyes: Pupils are equal, round, and reactive to light. EOM are normal.   Neck: Normal range of motion. Neck supple.   Cardiovascular: Normal rate, regular rhythm and normal heart sounds.   Pulmonary/Chest: Effort normal and breath sounds normal. No respiratory distress.   Abdominal: Soft. Bowel sounds are normal. She exhibits no distension.   Musculoskeletal: Normal range of motion. She exhibits no edema or tenderness.   Neurological: She is alert.   Not oriented to situation   Skin: Skin is  warm and dry. No erythema.   Psychiatric: She has a normal mood and affect. Her behavior is normal.       Significant Labs:   Urine Studies:   Recent Labs   Lab 04/17/19  1611   COLORU Yellow   APPEARANCEUA Cloudy*   PHUR 8.0   SPECGRAV 1.015   PROTEINUA Negative   GLUCUA 2+*   KETONESU Trace*   BILIRUBINUA Negative   OCCULTUA 1+*   NITRITE Negative   LEUKOCYTESUR Negative   RBCUA 15*   SQUAMEPITHEL 4       Significant Imaging: I have reviewed and interpreted all pertinent imaging results/findings within the past 24 hours.

## 2019-04-19 NOTE — ASSESSMENT & PLAN NOTE
-12/2018 echo showed EF 50-55%, dd  -consider repeat echo in setting of hypoxia on admission and elevated BNP >400 (previously 100s)  -DC lasix  - strict In/outs

## 2019-04-20 VITALS
TEMPERATURE: 98 F | WEIGHT: 84.88 LBS | DIASTOLIC BLOOD PRESSURE: 83 MMHG | SYSTOLIC BLOOD PRESSURE: 120 MMHG | OXYGEN SATURATION: 90 % | RESPIRATION RATE: 19 BRPM | HEART RATE: 93 BPM

## 2019-04-20 LAB
ALBUMIN SERPL BCP-MCNC: 3.3 G/DL (ref 3.5–5.2)
ALP SERPL-CCNC: 66 U/L (ref 55–135)
ALT SERPL W/O P-5'-P-CCNC: 14 U/L (ref 10–44)
ANION GAP SERPL CALC-SCNC: 11 MMOL/L (ref 8–16)
AST SERPL-CCNC: 13 U/L (ref 10–40)
BASOPHILS # BLD AUTO: 0.01 K/UL (ref 0–0.2)
BASOPHILS NFR BLD: 0.2 % (ref 0–1.9)
BILIRUB SERPL-MCNC: 0.5 MG/DL (ref 0.1–1)
BUN SERPL-MCNC: 21 MG/DL (ref 8–23)
CALCIUM SERPL-MCNC: 9.9 MG/DL (ref 8.7–10.5)
CHLORIDE SERPL-SCNC: 82 MMOL/L (ref 95–110)
CO2 SERPL-SCNC: 49 MMOL/L (ref 23–29)
CREAT SERPL-MCNC: 0.7 MG/DL (ref 0.5–1.4)
DIFFERENTIAL METHOD: ABNORMAL
EOSINOPHIL # BLD AUTO: 0 K/UL (ref 0–0.5)
EOSINOPHIL NFR BLD: 0 % (ref 0–8)
ERYTHROCYTE [DISTWIDTH] IN BLOOD BY AUTOMATED COUNT: 13.2 % (ref 11.5–14.5)
EST. GFR  (AFRICAN AMERICAN): >60 ML/MIN/1.73 M^2
EST. GFR  (NON AFRICAN AMERICAN): >60 ML/MIN/1.73 M^2
GLUCOSE SERPL-MCNC: 97 MG/DL (ref 70–110)
HCT VFR BLD AUTO: 35.6 % (ref 37–48.5)
HGB BLD-MCNC: 10.8 G/DL (ref 12–16)
IMM GRANULOCYTES # BLD AUTO: 0.02 K/UL (ref 0–0.04)
IMM GRANULOCYTES NFR BLD AUTO: 0.3 % (ref 0–0.5)
LYMPHOCYTES # BLD AUTO: 1.3 K/UL (ref 1–4.8)
LYMPHOCYTES NFR BLD: 19.4 % (ref 18–48)
MAGNESIUM SERPL-MCNC: 1.8 MG/DL (ref 1.6–2.6)
MCH RBC QN AUTO: 28.2 PG (ref 27–31)
MCHC RBC AUTO-ENTMCNC: 30.3 G/DL (ref 32–36)
MCV RBC AUTO: 93 FL (ref 82–98)
MONOCYTES # BLD AUTO: 1.1 K/UL (ref 0.3–1)
MONOCYTES NFR BLD: 16.2 % (ref 4–15)
NEUTROPHILS # BLD AUTO: 4.2 K/UL (ref 1.8–7.7)
NEUTROPHILS NFR BLD: 63.9 % (ref 38–73)
NRBC BLD-RTO: 0 /100 WBC
PHOSPHATE SERPL-MCNC: 3.2 MG/DL (ref 2.7–4.5)
PLATELET # BLD AUTO: 159 K/UL (ref 150–350)
PMV BLD AUTO: 11.6 FL (ref 9.2–12.9)
POCT GLUCOSE: 126 MG/DL (ref 70–110)
POCT GLUCOSE: 239 MG/DL (ref 70–110)
POCT GLUCOSE: 343 MG/DL (ref 70–110)
POTASSIUM SERPL-SCNC: 3.3 MMOL/L (ref 3.5–5.1)
PROT SERPL-MCNC: 6.2 G/DL (ref 6–8.4)
RBC # BLD AUTO: 3.83 M/UL (ref 4–5.4)
SODIUM SERPL-SCNC: 142 MMOL/L (ref 136–145)
WBC # BLD AUTO: 6.53 K/UL (ref 3.9–12.7)

## 2019-04-20 PROCEDURE — 99238 PR HOSPITAL DISCHARGE DAY,<30 MIN: ICD-10-PCS | Mod: ,,, | Performed by: INTERNAL MEDICINE

## 2019-04-20 PROCEDURE — 63600175 PHARM REV CODE 636 W HCPCS: Performed by: STUDENT IN AN ORGANIZED HEALTH CARE EDUCATION/TRAINING PROGRAM

## 2019-04-20 PROCEDURE — 83735 ASSAY OF MAGNESIUM: CPT

## 2019-04-20 PROCEDURE — 85025 COMPLETE CBC W/AUTO DIFF WBC: CPT

## 2019-04-20 PROCEDURE — 84100 ASSAY OF PHOSPHORUS: CPT

## 2019-04-20 PROCEDURE — 25000003 PHARM REV CODE 250: Performed by: STUDENT IN AN ORGANIZED HEALTH CARE EDUCATION/TRAINING PROGRAM

## 2019-04-20 PROCEDURE — 36415 COLL VENOUS BLD VENIPUNCTURE: CPT

## 2019-04-20 PROCEDURE — 25000242 PHARM REV CODE 250 ALT 637 W/ HCPCS: Performed by: STUDENT IN AN ORGANIZED HEALTH CARE EDUCATION/TRAINING PROGRAM

## 2019-04-20 PROCEDURE — 99238 HOSP IP/OBS DSCHRG MGMT 30/<: CPT | Mod: ,,, | Performed by: INTERNAL MEDICINE

## 2019-04-20 PROCEDURE — 80053 COMPREHEN METABOLIC PANEL: CPT

## 2019-04-20 PROCEDURE — 27000221 HC OXYGEN, UP TO 24 HOURS

## 2019-04-20 RX ORDER — AMOXICILLIN 250 MG
1 CAPSULE ORAL DAILY
COMMUNITY

## 2019-04-20 RX ORDER — FLUTICASONE FUROATE AND VILANTEROL 200; 25 UG/1; UG/1
1 POWDER RESPIRATORY (INHALATION) DAILY
Qty: 1 EACH | Refills: 1 | Status: CANCELLED | OUTPATIENT
Start: 2019-04-21

## 2019-04-20 RX ORDER — PREDNISONE 20 MG/1
40 TABLET ORAL DAILY
Qty: 2 TABLET | Refills: 0 | Status: SHIPPED | OUTPATIENT
Start: 2019-04-21 | End: 2019-04-22

## 2019-04-20 RX ORDER — DILTIAZEM HYDROCHLORIDE 180 MG/1
180 CAPSULE, COATED, EXTENDED RELEASE ORAL DAILY
COMMUNITY

## 2019-04-20 RX ORDER — AMLODIPINE BESYLATE 10 MG/1
10 TABLET ORAL DAILY
COMMUNITY

## 2019-04-20 RX ORDER — IPRATROPIUM BROMIDE AND ALBUTEROL SULFATE 2.5; .5 MG/3ML; MG/3ML
3 SOLUTION RESPIRATORY (INHALATION) EVERY 6 HOURS PRN
COMMUNITY

## 2019-04-20 RX ORDER — DILTIAZEM HYDROCHLORIDE 180 MG/1
180 CAPSULE, COATED, EXTENDED RELEASE ORAL DAILY
Qty: 30 CAPSULE | Refills: 11 | Status: CANCELLED | OUTPATIENT
Start: 2019-04-21 | End: 2020-04-20

## 2019-04-20 RX ORDER — ASPIRIN 81 MG/1
81 TABLET ORAL DAILY
Refills: 0 | Status: CANCELLED | COMMUNITY
Start: 2019-04-21 | End: 2020-04-20

## 2019-04-20 RX ORDER — MONTELUKAST SODIUM 10 MG/1
10 TABLET ORAL DAILY
Qty: 30 TABLET | Refills: 0 | Status: CANCELLED | OUTPATIENT
Start: 2019-04-21 | End: 2019-05-21

## 2019-04-20 RX ORDER — TIOTROPIUM BROMIDE 18 UG/1
1 CAPSULE ORAL; RESPIRATORY (INHALATION) DAILY
Qty: 1 BOX | Refills: 0 | Status: CANCELLED | OUTPATIENT
Start: 2019-04-21 | End: 2020-04-20

## 2019-04-20 RX ORDER — NAPROXEN SODIUM 220 MG/1
81 TABLET, FILM COATED ORAL DAILY
COMMUNITY

## 2019-04-20 RX ORDER — ATORVASTATIN CALCIUM 20 MG/1
20 TABLET, FILM COATED ORAL DAILY
Qty: 90 TABLET | Refills: 3 | Status: CANCELLED | OUTPATIENT
Start: 2019-04-21 | End: 2020-04-20

## 2019-04-20 RX ORDER — DOXYCYCLINE HYCLATE 100 MG
100 TABLET ORAL EVERY 12 HOURS
Qty: 2 TABLET | Refills: 0 | Status: SHIPPED | OUTPATIENT
Start: 2019-04-21 | End: 2019-04-22

## 2019-04-20 RX ORDER — HYDROCHLOROTHIAZIDE 25 MG/1
25 TABLET ORAL DAILY
COMMUNITY

## 2019-04-20 RX ORDER — TIOTROPIUM BROMIDE 18 UG/1
18 CAPSULE ORAL; RESPIRATORY (INHALATION) DAILY
COMMUNITY

## 2019-04-20 RX ORDER — AMLODIPINE BESYLATE 10 MG/1
10 TABLET ORAL DAILY
Qty: 30 TABLET | Refills: 11 | Status: CANCELLED | OUTPATIENT
Start: 2019-04-21 | End: 2020-04-20

## 2019-04-20 RX ORDER — ACETAMINOPHEN 325 MG/1
650 TABLET ORAL EVERY 6 HOURS PRN
Status: DISCONTINUED | OUTPATIENT
Start: 2019-04-20 | End: 2019-04-20 | Stop reason: HOSPADM

## 2019-04-20 RX ORDER — INSULIN ASPART 100 [IU]/ML
3 INJECTION, SOLUTION INTRAVENOUS; SUBCUTANEOUS
Status: DISCONTINUED | OUTPATIENT
Start: 2019-04-20 | End: 2019-04-20 | Stop reason: HOSPADM

## 2019-04-20 RX ORDER — FLUTICASONE PROPIONATE AND SALMETEROL 250; 50 UG/1; UG/1
2 POWDER RESPIRATORY (INHALATION) 2 TIMES DAILY
COMMUNITY

## 2019-04-20 RX ORDER — ATORVASTATIN CALCIUM 20 MG/1
20 TABLET, FILM COATED ORAL DAILY
COMMUNITY

## 2019-04-20 RX ORDER — MONTELUKAST SODIUM 10 MG/1
10 TABLET ORAL NIGHTLY
COMMUNITY

## 2019-04-20 RX ADMIN — ASPIRIN 81 MG: 81 TABLET, COATED ORAL at 09:04

## 2019-04-20 RX ADMIN — INSULIN ASPART 3 UNITS: 100 INJECTION, SOLUTION INTRAVENOUS; SUBCUTANEOUS at 11:04

## 2019-04-20 RX ADMIN — PREDNISONE 40 MG: 20 TABLET ORAL at 09:04

## 2019-04-20 RX ADMIN — TIOTROPIUM BROMIDE 18 MCG: 18 CAPSULE ORAL; RESPIRATORY (INHALATION) at 09:04

## 2019-04-20 RX ADMIN — ACETAMINOPHEN 650 MG: 325 TABLET ORAL at 02:04

## 2019-04-20 RX ADMIN — AMLODIPINE BESYLATE 10 MG: 10 TABLET ORAL at 09:04

## 2019-04-20 RX ADMIN — INSULIN ASPART 2 UNITS: 100 INJECTION, SOLUTION INTRAVENOUS; SUBCUTANEOUS at 09:04

## 2019-04-20 RX ADMIN — FLUTICASONE FUROATE AND VILANTEROL TRIFENATATE 1 PUFF: 200; 25 POWDER RESPIRATORY (INHALATION) at 09:04

## 2019-04-20 RX ADMIN — MONTELUKAST SODIUM 10 MG: 10 TABLET, FILM COATED ORAL at 09:04

## 2019-04-20 RX ADMIN — DILTIAZEM HYDROCHLORIDE 180 MG: 180 CAPSULE, COATED, EXTENDED RELEASE ORAL at 09:04

## 2019-04-20 RX ADMIN — ATORVASTATIN CALCIUM 20 MG: 20 TABLET, FILM COATED ORAL at 09:04

## 2019-04-20 RX ADMIN — DOXYCYCLINE HYCLATE 100 MG: 100 TABLET, COATED ORAL at 09:04

## 2019-04-20 NOTE — PLAN OF CARE
Problem: Adult Inpatient Plan of Care  Goal: Patient-Specific Goal (Individualization)  Pt free of falls and injury. Pt AAOx2. Disoriented to time and situation.Refused Bipap at night.2L per nasal canula.Tylenol for headche.Notified medicine team 5 of blood glucose 428. 3 units of short acting& 4 units of long acting insulin administered.Notified medicine team 5 about new glucose result within 1 hr blood glucose of 343, no new orders were placed.  Diabetes education provided.Bed low, breaks locked, SR up x2, call light within reach, will continue to monitor.

## 2019-04-20 NOTE — SUBJECTIVE & OBJECTIVE
Interval History: no acute events overnight. Refused to wear BiPAP overnight. Patient with no family at bedside during examination in the morning. AOx4. Reiterated the importance of wearing BiPAP at night as well as the severity of her lung disease.     Review of Systems   Constitutional: Negative for activity change, appetite change, chills and fever.   HENT: Negative for congestion.    Eyes: Negative for pain and itching.   Respiratory: Negative for cough, chest tightness, shortness of breath and wheezing.    Cardiovascular: Negative for chest pain and leg swelling.   Gastrointestinal: Negative for abdominal pain and nausea.   Endocrine: Negative for polyphagia and polyuria.   Genitourinary: Negative for dysuria and frequency.   Musculoskeletal: Negative for back pain.   Neurological: Negative for numbness and headaches.   Psychiatric/Behavioral: Negative for agitation and behavioral problems.     Objective:     Vital Signs (Most Recent):  Temp: 98.1 °F (36.7 °C) (04/20/19 0800)  Pulse: 76 (04/20/19 0800)  Resp: 17 (04/20/19 0800)  BP: 102/69 (04/20/19 0800)  SpO2: 97 % (04/20/19 0800) Vital Signs (24h Range):  Temp:  [98.1 °F (36.7 °C)] 98.1 °F (36.7 °C)  Pulse:  [63-92] 76  Resp:  [13-21] 17  SpO2:  [97 %-100 %] 97 %  BP: ()/(63-79) 102/69     Weight: 38.5 kg (84 lb 14 oz)  There is no height or weight on file to calculate BMI.    Intake/Output Summary (Last 24 hours) at 4/20/2019 0841  Last data filed at 4/20/2019 0600  Gross per 24 hour   Intake 380 ml   Output 650 ml   Net -270 ml      Physical Exam   Constitutional: She is oriented to person, place, and time. She appears well-developed. No distress.   frail   HENT:   Head: Normocephalic and atraumatic.   Eyes: Pupils are equal, round, and reactive to light. EOM are normal.   Neck: Normal range of motion. Neck supple.   Cardiovascular: Normal rate, regular rhythm and normal heart sounds.   Pulmonary/Chest: Effort normal and breath sounds normal. No  respiratory distress.   Abdominal: Soft. Bowel sounds are normal. She exhibits no distension.   Musculoskeletal: Normal range of motion. She exhibits no edema or tenderness.   Neurological: She is alert and oriented to person, place, and time.   Skin: Skin is warm and dry. No erythema.   Psychiatric: She has a normal mood and affect. Her behavior is normal.       Significant Labs:   Recent Lab Results       04/20/19  0758   04/20/19  0012   04/19/19  2148   04/19/19  2045   04/19/19  1654        Glucose, Fasting       503  Comment:  *Critical value -   Results called to and read back by:GRAY ANGEL RN         POCT Glucose [phone]   341                      04/19/19  1208        Glucose, Fasting       POCT Glucose 216         All pertinent labs within the past 24 hours have been reviewed.    Significant Imaging: I have reviewed and interpreted all pertinent imaging results/findings within the past 24 hours.

## 2019-04-20 NOTE — PROGRESS NOTES
Notified medicine team 1 of blood glucose  Of 428, instructed to give PRN Aspart 3 units and to recheck blood glucose in 4 hrs.    21:59 Received a call from lab, was notified of blood glucose of 503.Informed medicine team 1.Will administer additional 4 units of long acting insulin.

## 2019-04-20 NOTE — NURSING
Patient discharged to home with family. Discharge papers reviewed with family who voiced understanding. IV removed with tip intact. Medications reviewed with family by md. Awaiting transport.

## 2019-04-20 NOTE — PROGRESS NOTES
[facility] Hospital Medicine  Progress Note    Patient Name: Monica Strickland  MRN: [id number]  Patient Class: IP- Inpatient   Admission Date: 4/17/2019  Length of Stay: 3 days  Attending Physician: Junior Luevano MD  Primary Care Provider: [facility] Doctor [facility] Medicine Team: [facility] Good Arango MD    Subjective:     Principal Problem:Acute on chronic respiratory failure with hypoxia and hypercapnia    HPI:  This is 63 yro F with PMH of COPD (3 L home O2, home bipap qhs), HTN, HTN, SLE, NIDDM2 (A1C 6.9), and HFpEF (echo 12/2018 dd) who presents to the ED 4/17/19 with SOB present for about the past week.     Per ED note, pt received DuoNebs x 3 en route to the ED and was placed on 6 L and O2 sats remained around 92%. Patient denies cough, sputum production, sick contacts, fevers, chills, CP. She states she has received her flu and PNA shots. She was recently discharged from the [facility] ICU 4/12/19 for acute on chronic hypercapnic respiratory failure due to COPD exacerbation with PCO2 on presentation of 143 and required intubation. At that time, she was noted to be non-compliant with her COPD medications. She was discharged with Spiriva, PRN duonebs, and breo.  reports she has been non-complaint with her qhs bipap, taking it off in the middle of the night about every other night.     In the ED, patient received duonebs and IV steroids. Her ABG at 16:28 showed pH of 7.30, CO2 121, O2 64, HCO3 60. Patient was then placed on bipap at 18/6 and 50%. Repeat blood gas about 1.5 hrs later showed pH 7.39, and CO2 in the 105. Significant labs in ED: serum [id number], . CXR showed congestion consistent with heart failure.     Pt was evaluated by MICU which felt thatis stable for care on the floor.     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        Hospital Course:  Admitted to  for acute on chronic respiratory failure with hypercapnia. Mentation and pH improved  after tx with BIPAP. Palliative care consulted for assistance with goals of care discussion given progression of disease with recurrent hospital admissions and medication and BIPAP noncompliance at home. However, palliative and team unable to reach family. On day 2 pt tolerated BIPAP overnight, found to be HD stable with sats at goal on 3L NC (baseline).     Interval History: no acute events overnight. Refused to wear BiPAP overnight. Patient with no family at bedside during examination in the morning. AOx4. Reiterated the importance of wearing BiPAP at night as well as the severity of her lung disease.     Review of Systems   Constitutional: Negative for activity change, appetite change, chills and fever.   HENT: Negative for congestion.    Eyes: Negative for pain and itching.   Respiratory: Negative for cough, chest tightness, shortness of breath and wheezing.    Cardiovascular: Negative for chest pain and leg swelling.   Gastrointestinal: Negative for abdominal pain and nausea.   Endocrine: Negative for polyphagia and polyuria.   Genitourinary: Negative for dysuria and frequency.   Musculoskeletal: Negative for back pain.   Neurological: Negative for numbness and headaches.   Psychiatric/Behavioral: Negative for agitation and behavioral problems.     Objective:     Vital Signs (Most Recent):  Temp: 98.1 °F (36.7 °C) (04/20/19 0800)  Pulse: 76 (04/20/19 0800)  Resp: 17 (04/20/19 0800)  BP: 102/69 (04/20/19 0800)  SpO2: 97 % (04/20/19 0800) Vital Signs (24h Range):  Temp:  [98.1 °F (36.7 °C)] 98.1 °F (36.7 °C)  Pulse:  [63-92] 76  Resp:  [13-21] 17  SpO2:  [97 %-100 %] 97 %  BP: ()/(63-79) 102/69     Weight: 38.5 kg (84 lb 14 oz)  There is no height or weight on file to calculate BMI.    Intake/Output Summary (Last 24 hours) at 4/20/2019 0841  Last data filed at 4/20/2019 0600  Gross per 24 hour   Intake 380 ml   Output 650 ml   Net -270 ml      Physical Exam   Constitutional: She is oriented to person,  place, and time. She appears well-developed. No distress.   frail   HENT:   Head: Normocephalic and atraumatic.   Eyes: Pupils are equal, round, and reactive to light. EOM are normal.   Neck: Normal range of motion. Neck supple.   Cardiovascular: Normal rate, regular rhythm and normal heart sounds.   Pulmonary/Chest: Effort normal and breath sounds normal. No respiratory distress.   Abdominal: Soft. Bowel sounds are normal. She exhibits no distension.   Musculoskeletal: Normal range of motion. She exhibits no edema or tenderness.   Neurological: She is alert and oriented to person, place, and time.   Skin: Skin is warm and dry. No erythema.   Psychiatric: She has a normal mood and affect. Her behavior is normal.       Significant Labs:   Recent Lab Results       04/20/19  0758   04/20/19  0012   04/19/19  2148   04/19/19  2045   04/19/19  1654        Glucose, Fasting       503  Comment:  *Critical value -   Results called to and read back by:GRAY ANGEL RN         POCT Glucose 239 343 428   341                      04/19/19  1208        Glucose, Fasting       POCT Glucose 216         All pertinent labs within the past 24 hours have been reviewed.    Significant Imaging: I have reviewed and interpreted all pertinent imaging results/findings within the past 24 hours.    Assessment/Plan:      * Acute on chronic respiratory failure with hypoxia and hypercapnia  63 yro F with PMH of COPD (3 L home O2, home bipap qhs),  SLE, and HFpEF (echo 12/2018 dd) who presents to the ED 4/17/19 with SOB likely 2/2 COPD exacerbation from home bipap non-compliance, as well as component of CHF exacerbation.    -She is chronically hypercarbic with serum HCO3 47.    -OSH CT-Chest wo contrast in 02/2019:  Extensive bilateral centrilobular emphysema. Fibronodular changes of the bilateral lung apices, unchanged. Likely dependent atelectasis at the left lung base. The airways are clear.  No pleural effusion or thickening. No  "pneumothorax.No pericardial effusion.    -OSH PFT in 2017: + severe obstruction FEV1 44% of predicted "seen by Dr. aragon in 2017"    Plan:    1- Continue BiPAP qhs  2- goal sats 88-92%, providing more oxygen than necessary will worsen COPD exacerbation  3- Continue prednisone 40 mg daily for 5 days. (end 4/22)  4- Continue doxycycline for total of 5 days. (4/22)  5- resume home meds (spiriva, breo, singulair).  6- Duo-nebs PRN      Goals of care, counseling/discussion  Does not appear that patient is capable of grasping the severity of her medical condition.   Palliative consulted for assistance with goals of care and potentially transitioning to home hospice care  Will f/up with palliative care and continue to reach out to family      Hematuria  - UA with 15 RBC.  - recommend urology outpt follow up.    Thrombocytopenia  U/S abdomen 01/2019- Liver normal in size and echotexture without evidence of focal lesion. The main portal vein is patent with hepatopedal flow. Common duct 2 mm in diameter. No intrahepatic biliary ductal dilatation. Gallbladder partially distended. Small amount of sludge in the gallbladder. No gallstones are seen. A trace amount of free fluid is seen adjacent to the gallbladder and liver. No abnormal gallbladder wall thickening. Right kidney within normal. Spleen within normal. 1 cm cyst at the upper pole of the left kidney. Left kidney otherwise within normal.    - unclear etiology,most likely due to her SLE.  - Chronic, Stable.       Systemic lupus erythematosus  -Per Care everywhere; Dr. Newsome dx her with Lupus 5 yrs ago, she was never on any medications for lupus. "EDD 1: 640 speckled pattern , with sm rnp + ve , leukopenia , fatigue , alopecia " She was seen once in rheumatology clinic in 2017.    Plan:  1- EDD, DsDNA- NEG  2- anti sm/rnp- pending  3- ESR wnl and CRP elevated  4- no active issues    Hyperlipidemia  - Continue Lipitor 20 mg daily.      Type 2 diabetes mellitus, without " long-term current use of insulin  -Last A1c 6.9  -home meds: metformin 500 BID  -LDSSI, detemir 4u at night , aspart 3u with meals    Essential hypertension  -resume home anti-HTNsive medications  -homemeds: HCTZ 25, Norvasc 10, diltiazem 180    (HFpEF) heart failure with preserved ejection fraction  -12/2018 echo showed EF 50-55%, dd  - strict In/outs      VTE Risk Mitigation (From admission, onward)        Ordered     enoxaparin injection 30 mg  Daily      04/17/19 2205        Plan to be discussed with attending Dr. Junior Luevano MD , further recommendations as per attending addendum. Please feel free to call with any questions or concerns.    Good Arango MD  Resident Physician, PGY1      Good Arango MD  Department of Hospital Medicine   [facility]

## 2019-04-20 NOTE — DISCHARGE SUMMARY
[facility] Hospital Medicine  Discharge Summary      Patient Name: Monica Strickland  MRN: [id number]  Admission Date: 4/17/2019  Hospital Length of Stay: 3 days  Discharge Date and Time:  04/20/2019 10:35 AM  Attending Physician: Junior Luevano MD   Discharging Provider: Good Arango MD  Primary Care Provider: [facility] Medicine Team: [facility] 1 Good Aranog MD    HPI:   This is 63 yro F with PMH of COPD (3 L home O2, home bipap qhs), HTN, HTN, SLE, NIDDM2 (A1C 6.9), and HFpEF (echo 12/2018 dd) who presents to the ED 4/17/19 with SOB present for about the past week.     Per ED note, pt received DuoNebs x 3 en route to the ED and was placed on 6 L and O2 sats remained around 92%. Patient denies cough, sputum production, sick contacts, fevers, chills, CP. She states she has received her flu and PNA shots. She was recently discharged from the [facility] ICU 4/12/19 for acute on chronic hypercapnic respiratory failure due to COPD exacerbation with PCO2 on presentation of 143 and required intubation. At that time, she was noted to be non-compliant with her COPD medications. She was discharged with Spiriva, PRN duonebs, and breo.  reports she has been non-complaint with her qhs bipap, taking it off in the middle of the night about every other night.     In the ED, patient received duonebs and IV steroids. Her ABG at 16:28 showed pH of 7.30, CO2 121, O2 64, HCO3 60. Patient was then placed on bipap at 18/6 and 50%. Repeat blood gas about 1.5 hrs later showed pH 7.39, and CO2 in the 105. Significant labs in ED: serum [id number], . CXR showed congestion consistent with heart failure.     Pt was evaluated by MICU which felt thatis stable for care on the floor.     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        * No surgery found *      Hospital Course:   Admitted to  for acute on chronic respiratory failure with hypercapnia. Mentation and pH  improved after tx with BIPAP. Palliative care consulted for assistance with goals of care discussion given progression of disease with recurrent hospital admissions and medication and BIPAP noncompliance at home. However, palliative and team unable to reach family. On day 2 pt tolerated BIPAP overnight, found to be HD stable with sats at goal on 3L NC (baseline). Stable for discharge 4/20. Patient intermittently cooperative with BiPAP during hospital stay. Overall given number of recent hospitalizations for respiratory distress and overall patient's lack of acceptance of severity of disease (COPD and HFpEF) and subsequent non-compliance with medical therapy (LABA/lAMA/ICS and BiPAP for COPD), patient is a very high risk of readmission.      Consults:   Consults (From admission, onward)        Status Ordering Provider     Inpatient consult to Critical Care Medicine  Once     Provider:  (Not yet assigned)    Completed JESSE ROCK     Inpatient consult to Palliative Care  Once     Provider:  (Not yet assigned)    Completed SHAHID JUAREZ            Final Active Diagnoses:    Diagnosis Date Noted POA    PRINCIPAL PROBLEM:  Acute on chronic respiratory failure with hypoxia and hypercapnia [J96.21, J96.22] 04/17/2019 Yes    Goals of care, counseling/discussion [Z71.89] 04/19/2019 Not Applicable    (HFpEF) heart failure with preserved ejection fraction [I50.30] 04/17/2019 Yes    Essential hypertension [I10] 04/17/2019 Yes    Type 2 diabetes mellitus, without long-term current use of insulin [E11.9] 04/17/2019 Yes    Hyperlipidemia [E78.5] 04/17/2019 Yes    Hematuria [R31.9] 04/17/2019 Yes    Systemic lupus erythematosus [M32.9] 01/26/2019 Yes    Thrombocytopenia [D69.6] 11/10/2018 Yes      Problems Resolved During this Admission:     Assessment/Plan:      * Acute on chronic respiratory failure with hypoxia and hypercapnia  63 yro F with PMH of COPD (3 L home O2, home bipap qhs),  SLE, and HFpEF  "(echo 12/2018 dd) who presents to the ED 4/17/19 with SOB likely 2/2 COPD exacerbation from home bipap non-compliance, as well as component of CHF exacerbation.     -She is chronically hypercarbic with serum HCO3 47.     -OSH CT-Chest wo contrast in 02/2019:  Extensive bilateral centrilobular emphysema. Fibronodular changes of the bilateral lung apices, unchanged. Likely dependent atelectasis at the left lung base. The airways are clear.  No pleural effusion or thickening. No pneumothorax.No pericardial effusion.     -OSH PFT in 2017: + severe obstruction FEV1 44% of predicted "seen by Dr. aragon in 2017"     Plan:     1- Continue BiPAP qhs  2- goal sats 88-92%, providing more oxygen than necessary will worsen COPD exacerbation  3- Continue prednisone 40 mg daily for 5 days. (end 4/22)  4- Continue doxycycline for total of 5 days. (4/22)  5- resume home meds (spiriva, breo, singulair).  6- Duo-nebs PRN        Goals of care, counseling/discussion  Does not appear that patient is capable of grasping the severity of her medical condition.   Palliative consulted for assistance with goals of care and potentially transitioning to home hospice care  Will f/up with palliative care and continue to reach out to family        Hematuria  - UA with 15 RBC.  - recommend urology outpt follow up.     Thrombocytopenia  U/S abdomen 01/2019- Liver normal in size and echotexture without evidence of focal lesion. The main portal vein is patent with hepatopedal flow. Common duct 2 mm in diameter. No intrahepatic biliary ductal dilatation. Gallbladder partially distended. Small amount of sludge in the gallbladder. No gallstones are seen. A trace amount of free fluid is seen adjacent to the gallbladder and liver. No abnormal gallbladder wall thickening. Right kidney within normal. Spleen within normal. 1 cm cyst at the upper pole of the left kidney. Left kidney otherwise within normal.     - unclear etiology,most likely due to her " "SLE.  - Chronic, Stable.         Systemic lupus erythematosus  -Per Care everywhere; Dr. Newsome dx her with Lupus 5 yrs ago, she was never on any medications for lupus. "EDD 1: 640 speckled pattern , with sm rnp + ve , leukopenia , fatigue , alopecia " She was seen once in rheumatology clinic in 2017.     Plan:  1- EDD, DsDNA- NEG  2- anti sm/rnp- pending  3- ESR wnl and CRP elevated  4- no active issues     Hyperlipidemia  - Continue Lipitor 20 mg daily.        Type 2 diabetes mellitus, without long-term current use of insulin  -Last A1c 6.9  -home meds: metformin 500 BID  -LDSSI, detemir 4u at night , aspart 3u with meals     Essential hypertension  -resume home anti-HTNsive medications  -homemeds: HCTZ 25, Norvasc 10, diltiazem 180     (HFpEF) heart failure with preserved ejection fraction  -12/2018 echo showed EF 50-55%, dd  - strict In/outs        Discharged Condition: poor    Disposition: home    Follow Up:    Patient Instructions:   No discharge procedures on file.    Significant Diagnostic Studies: Labs:   CMP   Recent Labs   Lab 04/19/19  0537 04/20/19  0729    142   K 3.9 3.3*   CL 70* 82*   CO2 49* 49*   * 97   BUN 30* 21   CREATININE 0.8 0.7   CALCIUM 10.2 9.9   PROT 7.1 6.2   ALBUMIN 3.6 3.3*   BILITOT 0.8 0.5   ALKPHOS 72 66   AST 20 13   ALT 17 14   ANIONGAP 19* 11   ESTGFRAFRICA >60.0 >60.0   EGFRNONAA >60.0 >60.0    and CBC   Recent Labs   Lab 04/19/19  0537 04/20/19  0729   WBC 8.01 6.53   HGB 12.0 10.8*   HCT 39.8 35.6*    159       Pending Diagnostic Studies:     Procedure Component Value Units Date/Time    Hemoglobin A1c if not done in past 3 months [[id number]] Collected:  04/17/19 1825    Order Status:  Sent Lab Status:  In process Updated:  04/17/19 1825    Specimen:  Blood          Medications:  Reconciled Home Medications:      Medication List      START taking these medications    doxycycline 100 MG tablet  Commonly known as:  VIBRA-TABS  Take 1 tablet (100 mg total) by " mouth every 12 (twelve) hours. for 1 day  Start taking on:  4/21/2019     predniSONE 20 MG tablet  Commonly known as:  DELTASONE  Take 2 tablets (40 mg total) by mouth once daily. for 1 day  Start taking on:  4/21/2019        CONTINUE taking these medications    albuterol-ipratropium 2.5 mg-0.5 mg/3 mL nebulizer solution  Commonly known as:  DUO-NEB  Take 3 mLs by nebulization every 6 (six) hours as needed for Wheezing. Rescue     amLODIPine 10 MG tablet  Commonly known as:  NORVASC  Take 10 mg by mouth once daily.     aspirin 81 MG Chew  Take 81 mg by mouth once daily.     atorvastatin 20 MG tablet  Commonly known as:  LIPITOR  Take 20 mg by mouth once daily.     diltiaZEM 180 MG 24 hr capsule  Commonly known as:  CARDIZEM CD  Take 180 mg by mouth once daily.     fluticasone-salmeterol 250-50 mcg/dose 250-50 mcg/dose diskus inhaler  Commonly known as:  ADVAIR  Inhale 2 puffs into the lungs 2 (two) times daily. Controller     hydroCHLOROthiazide 25 MG tablet  Commonly known as:  HYDRODIURIL  Take 25 mg by mouth once daily.     montelukast 10 mg tablet  Commonly known as:  SINGULAIR  Take 10 mg by mouth every evening.     senna-docusate 8.6-50 mg 8.6-50 mg per tablet  Commonly known as:  PERICOLACE  Take 1 tablet by mouth once daily.     tiotropium 18 mcg inhalation capsule  Commonly known as:  SPIRIVA  Inhale 18 mcg into the lungs once daily. Controller            Indwelling Lines/Drains at time of discharge:   Lines/Drains/Airways          None          Time spent on the discharge of patient: 31 minutes  Patient was seen and examined on the date of discharge and determined to be suitable for discharge.         Good Arango MD  Department of Hospital Medicine  [facility]

## 2019-04-20 NOTE — PLAN OF CARE
[facility]    HOME HEALTH ORDERS  FACE TO FACE ENCOUNTER    Patient Name: Monica Strickland  YOB: 1955    PCP: Primary Doctor No   PCP Address: None  PCP Phone Number: None  PCP Fax: None    Encounter Date: 04/20/2019    Admit to Home Health    Diagnoses:  Active Hospital Problems    Diagnosis  POA    *Acute on chronic respiratory failure with hypoxia and hypercapnia [J96.21, J96.22]  Yes    Goals of care, counseling/discussion [Z71.89]  Not Applicable    (HFpEF) heart failure with preserved ejection fraction [I50.30]  Yes    Essential hypertension [I10]  Yes    Type 2 diabetes mellitus, without long-term current use of insulin [E11.9]  Yes    Hyperlipidemia [E78.5]  Yes    Hematuria [R31.9]  Yes    Systemic lupus erythematosus [M32.9]  Yes    Thrombocytopenia [D69.6]  Yes      Resolved Hospital Problems   No resolved problems to display.       No future appointments.        I have seen and examined this patient face to face today. My clinical findings that support the need for the home health skilled services and home bound status are the following:  Patient with medication mismanagement issues requiring home bound status as evidenced by  Poor understanding of medication regimen/dosage and Poor adherence to medication regimen/dosage.    Allergies:Review of patient's allergies indicates:  No Known Allergies    Diet: regular diet    Activities: activity as tolerated    Nursing:   SN to complete comprehensive assessment including routine vital signs. Instruct on disease process and s/s of complications to report to MD. Review/verify medication list sent home with the patient at time of discharge  and instruct patient/caregiver as needed. Frequency may be adjusted depending on start of care date.    Notify MD if SBP > 160 or < 90; DBP > 90 or < 50; HR > 120 or < 50; Temp > 101; Other:         CONSULTS:     to evaluate for community resources/long-range planning.  Aide  to provide assistance with personal care, ADLs, and vital signs.    MISCELLANEOUS CARE:  Home Oxygen:  Oxygen at 2 L/min nasal canula to be used:  Continuously. and Assess oxygen saturation via pulse oximeter as needed for increase in SOB. Goal SpO2 88-92%   COPD: Please ensure patient has a functioning nebulizer and provide education on its usage.    WOUND CARE ORDERS  n/a      Medications: Review discharge medications with patient and family and provide education.      Current Discharge Medication List      START taking these medications    Details   doxycycline (VIBRA-TABS) 100 MG tablet Take 1 tablet (100 mg total) by mouth every 12 (twelve) hours. for 1 day  Qty: 2 tablet, Refills: 0      predniSONE (DELTASONE) 20 MG tablet Take 2 tablets (40 mg total) by mouth once daily. for 1 day  Qty: 2 tablet, Refills: 0         CONTINUE these medications which have NOT CHANGED    Details   albuterol-ipratropium (DUO-NEB) 2.5 mg-0.5 mg/3 mL nebulizer solution Take 3 mLs by nebulization every 6 (six) hours as needed for Wheezing. Rescue      amLODIPine (NORVASC) 10 MG tablet Take 10 mg by mouth once daily.      aspirin 81 MG Chew Take 81 mg by mouth once daily.      atorvastatin (LIPITOR) 20 MG tablet Take 20 mg by mouth once daily.      diltiaZEM (CARDIZEM CD) 180 MG 24 hr capsule Take 180 mg by mouth once daily.      fluticasone-salmeterol diskus inhaler 250-50 mcg Inhale 2 puffs into the lungs 2 (two) times daily. Controller      hydroCHLOROthiazide (HYDRODIURIL) 25 MG tablet Take 25 mg by mouth once daily.      montelukast (SINGULAIR) 10 mg tablet Take 10 mg by mouth every evening.      senna-docusate 8.6-50 mg (PERICOLACE) 8.6-50 mg per tablet Take 1 tablet by mouth once daily.      tiotropium (SPIRIVA) 18 mcg inhalation capsule Inhale 18 mcg into the lungs once daily. Controller             I certify that this patient is confined to her home and needs intermittent skilled nursing care.

## 2019-04-22 LAB
ANTI SM/RNP ANTIBODY: 15.98 EU (ref 0–19.99)
ANTI-SM/RNP INTERPRETATION: NEGATIVE
POCT GLUCOSE: >500 MG/DL (ref 70–110)

## 2019-04-22 NOTE — PLAN OF CARE
Plan is to discharge home with family.  Medicaid, no home health.       04/22/19 0728   Final Note   Assessment Type Final Discharge Note   Anticipated Discharge Disposition Home   Right Care Referral Info   Post Acute Recommendation No Care

## 2019-04-24 NOTE — PT/OT/SLP DISCHARGE
Physical Therapy Discharge Summary    Name: Monica Strickland  MRN: [id number]   Principal Problem: Acute on chronic respiratory failure with hypoxia and hypercapnia     Patient Discharged from acute Physical Therapy on 2019.  Please refer to prior PT noted date on 2019 for functional status.     Assessment:     Patient was discharged unexpectedly.  Information required to complete an accurate discharge summary is unknown.  Refer to therapy initial evaluation and last progress note for initial and most recent functional status and goal achievement.  Recommendations made may be found in medical record.    Objective:     GOALS:   Multidisciplinary Problems     Physical Therapy Goals        Problem: Physical Therapy Goal    Goal Priority Disciplines Outcome Goal Variances Interventions   Physical Therapy Goal     PT, PT/OT Ongoing (interventions implemented as appropriate)     Description:  Goals to be met by: 2019     Patient will increase functional independence with mobility by performin. Sit to stand transfer with Supervision  2. Bed to chair transfer with Supervision using LRAD  3. Ambulate 50ft c LRAD Supervision  4. Ascend/descend 12 stair with bilateral Handrails Contact Guard Assistance                    Reasons for Discontinuation of Therapy Services  Transfer to alternate level of care.      Plan:     Patient Discharged to: Home no PT services needed.    Liang Hagen, PT  2019

## 2019-04-30 ENCOUNTER — OUTPATIENT CASE MANAGEMENT (OUTPATIENT)
Dept: ADMINISTRATIVE | Facility: OTHER | Age: 64
End: 2019-04-30

## 2019-04-30 NOTE — PROGRESS NOTES
Please note the following patient's information was forwarded to the Medicaid Case Management office on 4/24/19.    Please contact Outpatient Complex Care Management at ext [phone] with any questions.    Thank you,    Kalyani Roland    Outpatient Case Management